# Patient Record
Sex: MALE | Race: BLACK OR AFRICAN AMERICAN | Employment: PART TIME | URBAN - METROPOLITAN AREA
[De-identification: names, ages, dates, MRNs, and addresses within clinical notes are randomized per-mention and may not be internally consistent; named-entity substitution may affect disease eponyms.]

---

## 2018-11-03 ENCOUNTER — HOSPITAL ENCOUNTER (EMERGENCY)
Age: 18
Discharge: HOME | End: 2018-11-03
Attending: EMERGENCY MEDICINE
Payer: SELF-PAY

## 2018-11-03 VITALS
WEIGHT: 115 LBS | RESPIRATION RATE: 14 BRPM | TEMPERATURE: 97.7 F | HEART RATE: 66 BPM | OXYGEN SATURATION: 100 % | DIASTOLIC BLOOD PRESSURE: 71 MMHG | SYSTOLIC BLOOD PRESSURE: 113 MMHG

## 2018-11-03 DIAGNOSIS — K52.9 GASTROENTERITIS, ACUTE: Primary | ICD-10-CM

## 2018-11-03 LAB
ALBUMIN SERPL-MCNC: 4.7 G/DL (ref 3.5–5)
ALBUMIN/GLOB SERPL: 1.3 {RATIO} (ref 1.1–2.2)
ALP SERPL-CCNC: 81 U/L (ref 45–117)
ALT SERPL-CCNC: 53 U/L (ref 12–78)
ANION GAP SERPL CALC-SCNC: 15 MMOL/L (ref 5–15)
AST SERPL-CCNC: 64 U/L (ref 15–37)
BASOPHILS # BLD: 0 K/UL (ref 0–0.1)
BASOPHILS NFR BLD: 0 % (ref 0–1)
BILIRUB SERPL-MCNC: 0.6 MG/DL (ref 0.2–1)
BUN SERPL-MCNC: 18 MG/DL (ref 6–20)
BUN/CREAT SERPL: 17 (ref 12–20)
CALCIUM SERPL-MCNC: 9.6 MG/DL (ref 8.5–10.1)
CHLORIDE SERPL-SCNC: 102 MMOL/L (ref 97–108)
CO2 SERPL-SCNC: 26 MMOL/L (ref 21–32)
CREAT SERPL-MCNC: 1.04 MG/DL (ref 0.7–1.3)
DIFFERENTIAL METHOD BLD: ABNORMAL
EOSINOPHIL # BLD: 0 K/UL (ref 0–0.4)
EOSINOPHIL NFR BLD: 0 % (ref 0–7)
ERYTHROCYTE [DISTWIDTH] IN BLOOD BY AUTOMATED COUNT: 12.7 % (ref 11.5–14.5)
GLOBULIN SER CALC-MCNC: 3.6 G/DL (ref 2–4)
GLUCOSE SERPL-MCNC: 80 MG/DL (ref 65–100)
HCT VFR BLD AUTO: 46.9 % (ref 36.6–50.3)
HGB BLD-MCNC: 16.4 G/DL (ref 12.1–17)
IMM GRANULOCYTES # BLD: 0.1 K/UL (ref 0–0.04)
IMM GRANULOCYTES NFR BLD AUTO: 0 % (ref 0–0.5)
LYMPHOCYTES # BLD: 1.2 K/UL (ref 0.8–3.5)
LYMPHOCYTES NFR BLD: 10 % (ref 12–49)
MCH RBC QN AUTO: 31.2 PG (ref 26–34)
MCHC RBC AUTO-ENTMCNC: 35 G/DL (ref 30–36.5)
MCV RBC AUTO: 89.2 FL (ref 80–99)
MONOCYTES # BLD: 0.4 K/UL (ref 0–1)
MONOCYTES NFR BLD: 4 % (ref 5–13)
NEUTS SEG # BLD: 9.6 K/UL (ref 1.8–8)
NEUTS SEG NFR BLD: 85 % (ref 32–75)
NRBC # BLD: 0 K/UL (ref 0–0.01)
NRBC BLD-RTO: 0 PER 100 WBC
PLATELET # BLD AUTO: 204 K/UL (ref 150–400)
PMV BLD AUTO: 10.3 FL (ref 8.9–12.9)
POTASSIUM SERPL-SCNC: 3.7 MMOL/L (ref 3.5–5.1)
PROT SERPL-MCNC: 8.3 G/DL (ref 6.4–8.2)
RBC # BLD AUTO: 5.26 M/UL (ref 4.1–5.7)
SODIUM SERPL-SCNC: 143 MMOL/L (ref 136–145)
WBC # BLD AUTO: 11.3 K/UL (ref 4.1–11.1)

## 2018-11-03 PROCEDURE — 96360 HYDRATION IV INFUSION INIT: CPT

## 2018-11-03 PROCEDURE — 80053 COMPREHEN METABOLIC PANEL: CPT | Performed by: NURSE PRACTITIONER

## 2018-11-03 PROCEDURE — 36415 COLL VENOUS BLD VENIPUNCTURE: CPT | Performed by: NURSE PRACTITIONER

## 2018-11-03 PROCEDURE — 85025 COMPLETE CBC W/AUTO DIFF WBC: CPT | Performed by: NURSE PRACTITIONER

## 2018-11-03 PROCEDURE — 99284 EMERGENCY DEPT VISIT MOD MDM: CPT

## 2018-11-03 PROCEDURE — 74011250636 HC RX REV CODE- 250/636: Performed by: NURSE PRACTITIONER

## 2018-11-03 PROCEDURE — 96361 HYDRATE IV INFUSION ADD-ON: CPT

## 2018-11-03 RX ORDER — ONDANSETRON 4 MG/1
4 TABLET, ORALLY DISINTEGRATING ORAL
Qty: 10 TAB | Refills: 0 | Status: SHIPPED | OUTPATIENT
Start: 2018-11-03 | End: 2019-12-30

## 2018-11-03 RX ADMIN — SODIUM CHLORIDE 1000 ML: 900 INJECTION, SOLUTION INTRAVENOUS at 13:34

## 2018-11-03 NOTE — ED PROVIDER NOTES
EMERGENCY DEPARTMENT HISTORY AND PHYSICAL EXAM 
 
Date: 11/3/2018 Patient Name: Linnea Sandoval History of Presenting Illness Chief Complaint Patient presents with  Vomiting History Provided By: Patient Chief Complaint: vomitng Duration: onset 7am  
Timing:  Acute Quality: large amount of liquid and undigested food Severity: Severe Modifying Factors: none Associated Symptoms: abdominal pain HPI: Linnea Sandoval is a 19 y.o. male with a PMH of No significant past medical history who presents with vomiting since 7am. States he ate chinese food last night and had one beer. States this am he vomited several times. States he does not usually eat much due to lack of food. PCP: Priya Steele MD 
 
 
 
Past History Past Medical History: 
History reviewed. No pertinent past medical history. Past Surgical History: 
History reviewed. No pertinent surgical history. Family History: 
History reviewed. No pertinent family history. Social History: 
Social History Tobacco Use  Smoking status: Current Every Day Smoker  Smokeless tobacco: Never Used Substance Use Topics  Alcohol use: Yes  Drug use: Yes Types: Marijuana Allergies: 
No Known Allergies Review of Systems Review of Systems Constitutional: Negative for chills, fatigue and fever. HENT: Negative for congestion and sore throat. Eyes: Negative for redness. Respiratory: Negative for cough, chest tightness and wheezing. Cardiovascular: Negative for chest pain. Gastrointestinal: Positive for abdominal pain, nausea and vomiting. Genitourinary: Negative for dysuria. Musculoskeletal: Negative for arthralgias, back pain, myalgias, neck pain and neck stiffness. Skin: Negative for rash. Neurological: Negative for dizziness, syncope, weakness, light-headedness, numbness and headaches. Hematological: Negative for adenopathy. Psychiatric/Behavioral: Negative for agitation and behavioral problems. All other systems reviewed and are negative. Physical Exam  
 
Vitals:  
 11/03/18 1312 BP: 113/71 Pulse: 66 Resp: 14 Temp: 97.7 °F (36.5 °C) SpO2: 100% Weight: 52.2 kg (115 lb) Physical Exam  
Constitutional: He is oriented to person, place, and time. He appears well-developed and well-nourished. HENT:  
Head: Normocephalic and atraumatic. Right Ear: External ear normal.  
Mouth/Throat: Oropharynx is clear and moist.  
Eyes: Conjunctivae are normal. Right eye exhibits no discharge. Left eye exhibits no discharge. Neck: Normal range of motion. Neck supple. Cardiovascular: Normal rate, regular rhythm and normal heart sounds. Pulmonary/Chest: Effort normal and breath sounds normal. No respiratory distress. He has no wheezes. Abdominal: Soft. Bowel sounds are normal. There is tenderness. Musculoskeletal: Normal range of motion. He exhibits no edema. Lymphadenopathy:  
  He has no cervical adenopathy. Neurological: He is alert and oriented to person, place, and time. No cranial nerve deficit. Skin: Skin is warm and dry. Psychiatric: He has a normal mood and affect. His behavior is normal. Judgment and thought content normal.  
Nursing note and vitals reviewed. Tolerated 12 oz ginger ale. Feels better will discharge. Diagnostic Study Results Labs - Recent Results (from the past 12 hour(s)) CBC WITH AUTOMATED DIFF Collection Time: 11/03/18  1:38 PM  
Result Value Ref Range WBC 11.3 (H) 4.1 - 11.1 K/uL  
 RBC 5.26 4.10 - 5.70 M/uL  
 HGB 16.4 12.1 - 17.0 g/dL HCT 46.9 36.6 - 50.3 % MCV 89.2 80.0 - 99.0 FL  
 MCH 31.2 26.0 - 34.0 PG  
 MCHC 35.0 30.0 - 36.5 g/dL  
 RDW 12.7 11.5 - 14.5 % PLATELET 476 499 - 030 K/uL MPV 10.3 8.9 - 12.9 FL  
 NRBC 0.0 0  WBC ABSOLUTE NRBC 0.00 0.00 - 0.01 K/uL NEUTROPHILS 85 (H) 32 - 75 % LYMPHOCYTES 10 (L) 12 - 49 % MONOCYTES 4 (L) 5 - 13 % EOSINOPHILS 0 0 - 7 % BASOPHILS 0 0 - 1 % IMMATURE GRANULOCYTES 0 0.0 - 0.5 % ABS. NEUTROPHILS 9.6 (H) 1.8 - 8.0 K/UL  
 ABS. LYMPHOCYTES 1.2 0.8 - 3.5 K/UL  
 ABS. MONOCYTES 0.4 0.0 - 1.0 K/UL  
 ABS. EOSINOPHILS 0.0 0.0 - 0.4 K/UL  
 ABS. BASOPHILS 0.0 0.0 - 0.1 K/UL  
 ABS. IMM. GRANS. 0.1 (H) 0.00 - 0.04 K/UL  
 DF AUTOMATED METABOLIC PANEL, COMPREHENSIVE Collection Time: 11/03/18  1:38 PM  
Result Value Ref Range Sodium 143 136 - 145 mmol/L Potassium 3.7 3.5 - 5.1 mmol/L Chloride 102 97 - 108 mmol/L  
 CO2 26 21 - 32 mmol/L Anion gap 15 5 - 15 mmol/L Glucose 80 65 - 100 mg/dL BUN 18 6 - 20 MG/DL Creatinine 1.04 0.70 - 1.30 MG/DL  
 BUN/Creatinine ratio 17 12 - 20 GFR est AA >60 >60 ml/min/1.73m2 GFR est non-AA >60 >60 ml/min/1.73m2 Calcium 9.6 8.5 - 10.1 MG/DL Bilirubin, total 0.6 0.2 - 1.0 MG/DL  
 ALT (SGPT) 53 12 - 78 U/L  
 AST (SGOT) 64 (H) 15 - 37 U/L Alk. phosphatase 81 45 - 117 U/L Protein, total 8.3 (H) 6.4 - 8.2 g/dL Albumin 4.7 3.5 - 5.0 g/dL Globulin 3.6 2.0 - 4.0 g/dL A-G Ratio 1.3 1.1 - 2.2 Radiologic Studies - No orders to display CT Results  (Last 48 hours) None CXR Results  (Last 48 hours) None Medical Decision Making I am the first provider for this patient. I reviewed the vital signs, available nursing notes, past medical history, past surgical history, family history and social history. Vital Signs-Reviewed the patient's vital signs. Records Reviewed: Nursing Notes and Old Medical Records Disposition: 
home DISCHARGE NOTE:  
 
 
 
  Care plan outlined and precautions discussed. Patient has no new complaints, changes, or physical findings. Results of tests were reviewed with the patient. All medications were reviewed with the patient; will d/c home with zofran. All of pt's questions and concerns were addressed. Patient was instructed and agrees to follow up with PCP, as well as to return to the ED upon further deterioration. Patient is ready to go home. Follow-up Information Follow up With Specialties Details Why Contact Info Abel Nair MD Pediatrics In 2 days  [address] 
[phone] Current Discharge Medication List  
  
START taking these medications Details  
ondansetron (ZOFRAN ODT) 4 mg disintegrating tablet Take 1 Tab by mouth every eight (8) hours as needed for Nausea. Qty: 10 Tab, Refills: 0 Provider Notes (Medical Decision Making): DDX gastroenteritis dehydration food poisoning Procedures: 
Procedures Diagnosis Clinical Impression:  
1. Gastroenteritis, acute

## 2018-11-03 NOTE — DISCHARGE INSTRUCTIONS
Gastroenteritis: Care Instructions  Your Care Instructions    Gastroenteritis is an illness that may cause nausea, vomiting, and diarrhea. It is sometimes called \"stomach flu. \" It can be caused by bacteria or a virus. You will probably begin to feel better in 1 to 2 days. In the meantime, get plenty of rest and make sure you do not become dehydrated. Dehydration occurs when your body loses too much fluid. Follow-up care is a key part of your treatment and safety. Be sure to make and go to all appointments, and call your doctor if you are having problems. It's also a good idea to know your test results and keep a list of the medicines you take. How can you care for yourself at home? · If your doctor prescribed antibiotics, take them as directed. Do not stop taking them just because you feel better. You need to take the full course of antibiotics. · Drink plenty of fluids to prevent dehydration, enough so that your urine is light yellow or clear like water. Choose water and other caffeine-free clear liquids until you feel better. If you have kidney, heart, or liver disease and have to limit fluids, talk with your doctor before you increase your fluid intake. · Drink fluids slowly, in frequent, small amounts, because drinking too much too fast can cause vomiting. · Begin eating mild foods, such as dry toast, yogurt, applesauce, bananas, and rice. Avoid spicy, hot, or high-fat foods, and do not drink alcohol or caffeine for a day or two. Do not drink milk or eat ice cream until you are feeling better. How to prevent gastroenteritis  · Keep hot foods hot and cold foods cold. · Do not eat meats, dressings, salads, or other foods that have been kept at room temperature for more than 2 hours. · Use a thermometer to check your refrigerator. It should be between 34°F and 40°F.  · Defrost meats in the refrigerator or microwave, not on the kitchen counter. · Keep your hands and your kitchen clean.  Wash your hands, cutting boards, and countertops with hot soapy water frequently. · Cook meat until it is well done. · Do not eat raw eggs or uncooked sauces made with raw eggs. · Do not take chances. If food looks or tastes spoiled, throw it out. When should you call for help? Call 911 anytime you think you may need emergency care. For example, call if:    · You vomit blood or what looks like coffee grounds.     · You passed out (lost consciousness).     · You pass maroon or very bloody stools.    Call your doctor now or seek immediate medical care if:    · You have severe belly pain.     · You have signs of needing more fluids. You have sunken eyes, a dry mouth, and pass only a little dark urine.     · You feel like you are going to faint.     · You have increased belly pain that does not go away in 1 to 2 days.     · You have new or increased nausea, or you are vomiting.     · You have a new or higher fever.     · Your stools are black and tarlike or have streaks of blood.    Watch closely for changes in your health, and be sure to contact your doctor if:    · You are dizzy or lightheaded.     · You urinate less than usual, or your urine is dark yellow or brown.     · You do not feel better with each day that goes by. Where can you learn more? Go to http://krzysztof-jaswinder.info/. Enter N142 in the search box to learn more about \"Gastroenteritis: Care Instructions. \"  Current as of: November 18, 2017  Content Version: 11.8  © 0866-1571 [employer]. Care instructions adapted under license by [employer] (which disclaims liability or warranty for this information). If you have questions about a medical condition or this instruction, always ask your healthcare professional. [place] [address] any warranty or liability for your use of this information.

## 2018-11-03 NOTE — ED NOTES
Emergency Department Nursing Plan of Care The Nursing Plan of Care is developed from the Nursing assessment and Emergency Department Attending provider initial evaluation. The plan of care may be reviewed in the ED Provider note. The Plan of Care was developed with the following considerations:  
Patient / Family readiness to learn indicated by:verbalized understanding Persons(s) to be included in education: patient Barriers to Learning/Limitations:No 
 
Signed Torrie Baumgarten, RN   
11/3/2018   2:19 PM

## 2019-01-23 ENCOUNTER — HOSPITAL ENCOUNTER (EMERGENCY)
Age: 19
Discharge: HOME | End: 2019-01-23
Attending: EMERGENCY MEDICINE
Payer: MEDICAID

## 2019-01-23 ENCOUNTER — APPOINTMENT (OUTPATIENT)
Dept: GENERAL RADIOLOGY | Age: 19
End: 2019-01-23
Attending: PHYSICIAN ASSISTANT
Payer: MEDICAID

## 2019-01-23 VITALS
DIASTOLIC BLOOD PRESSURE: 92 MMHG | OXYGEN SATURATION: 98 % | WEIGHT: 107.81 LBS | HEIGHT: 66 IN | HEART RATE: 113 BPM | SYSTOLIC BLOOD PRESSURE: 108 MMHG | TEMPERATURE: 100 F | BODY MASS INDEX: 17.33 KG/M2 | RESPIRATION RATE: 20 BRPM

## 2019-01-23 DIAGNOSIS — J10.1 INFLUENZA A: Primary | ICD-10-CM

## 2019-01-23 LAB
FLUAV AG NPH QL IA: POSITIVE
FLUBV AG NOSE QL IA: NEGATIVE

## 2019-01-23 PROCEDURE — 99283 EMERGENCY DEPT VISIT LOW MDM: CPT

## 2019-01-23 PROCEDURE — 71045 X-RAY EXAM CHEST 1 VIEW: CPT

## 2019-01-23 PROCEDURE — 87804 INFLUENZA ASSAY W/OPTIC: CPT

## 2019-01-23 PROCEDURE — 74011250637 HC RX REV CODE- 250/637: Performed by: PHYSICIAN ASSISTANT

## 2019-01-23 RX ORDER — NAPROXEN 500 MG/1
500 TABLET ORAL 2 TIMES DAILY WITH MEALS
Qty: 20 TAB | Refills: 0 | Status: SHIPPED | OUTPATIENT
Start: 2019-01-23 | End: 2019-12-30

## 2019-01-23 RX ORDER — IBUPROFEN 400 MG/1
400 TABLET ORAL
Status: COMPLETED | OUTPATIENT
Start: 2019-01-23 | End: 2019-01-23

## 2019-01-23 RX ADMIN — IBUPROFEN 400 MG: 400 TABLET ORAL at 11:05

## 2019-01-23 NOTE — DISCHARGE INSTRUCTIONS
Patient Education        Influenza (Flu): Care Instructions  Your Care Instructions    Influenza (flu) is an infection in the lungs and breathing passages. It is caused by the influenza virus. There are different strains, or types, of the flu virus from year to year. Unlike the common cold, the flu comes on suddenly and the symptoms, such as a cough, congestion, fever, chills, fatigue, aches, and pains, are more severe. These symptoms may last up to 10 days. Although the flu can make you feel very sick, it usually doesn't cause serious health problems. Home treatment is usually all you need for flu symptoms. But your doctor may prescribe antiviral medicine to prevent other health problems, such as pneumonia, from developing. Older people and those who have a long-term health condition, such as lung disease, are most at risk for having pneumonia or other health problems. Follow-up care is a key part of your treatment and safety. Be sure to make and go to all appointments, and call your doctor if you are having problems. It's also a good idea to know your test results and keep a list of the medicines you take. How can you care for yourself at home? · Get plenty of rest.  · Drink plenty of fluids, enough so that your urine is light yellow or clear like water. If you have kidney, heart, or liver disease and have to limit fluids, talk with your doctor before you increase the amount of fluids you drink. · Take an over-the-counter pain medicine if needed, such as acetaminophen (Tylenol), ibuprofen (Advil, Motrin), or naproxen (Aleve), to relieve fever, headache, and muscle aches. Read and follow all instructions on the label. No one younger than 20 should take aspirin. It has been linked to Reye syndrome, a serious illness. · Do not smoke. Smoking can make the flu worse. If you need help quitting, talk to your doctor about stop-smoking programs and medicines.  These can increase your chances of quitting for good.  · Breathe moist air from a hot shower or from a sink filled with hot water to help clear a stuffy nose. · Before you use cough and cold medicines, check the label. These medicines may not be safe for young children or for people with certain health problems. · If the skin around your nose and lips becomes sore, put some petroleum jelly on the area. · To ease coughing:  ? Drink fluids to soothe a scratchy throat. ? Suck on cough drops or plain hard candy. ? Take an over-the-counter cough medicine that contains dextromethorphan to help you get some sleep. Read and follow all instructions on the label. ? Raise your head at night with an extra pillow. This may help you rest if coughing keeps you awake. · Take any prescribed medicine exactly as directed. Call your doctor if you think you are having a problem with your medicine. To avoid spreading the flu  · Wash your hands regularly, and keep your hands away from your face. · Stay home from school, work, and other public places until you are feeling better and your fever has been gone for at least 24 hours. The fever needs to have gone away on its own without the help of medicine. · Ask people living with you to talk to their doctors about preventing the flu. They may get antiviral medicine to keep from getting the flu from you. · To prevent the flu in the future, get a flu vaccine every fall. Encourage people living with you to get the vaccine. · Cover your mouth when you cough or sneeze. When should you call for help? Call 911 anytime you think you may need emergency care.  For example, call if:    · You have severe trouble breathing.    Call your doctor now or seek immediate medical care if:    · You have new or worse trouble breathing.     · You seem to be getting much sicker.     · You feel very sleepy or confused.     · You have a new or higher fever.     · You get a new rash.    Watch closely for changes in your health, and be sure to contact your doctor if:    · You begin to get better and then get worse.     · You are not getting better after 1 week. Where can you learn more? Go to http://krzysztof-jaswinder.info/. Enter W975 in the search box to learn more about \"Influenza (Flu): Care Instructions. \"  Current as of: September 5, 2018  Content Version: 11.9  © 2587-3031 [employer]. Care instructions adapted under license by [employer] (which disclaims liability or warranty for this information). If you have questions about a medical condition or this instruction, always ask your healthcare professional. [place] [address] any warranty or liability for your use of this information.

## 2019-01-23 NOTE — ED PROVIDER NOTES
EMERGENCY DEPARTMENT HISTORY AND PHYSICAL EXAM 
 
 
Date: 1/23/2019 Patient Name: Shad Dave History of Presenting Illness Chief Complaint Patient presents with  Generalized Body Aches  
  since Sat, states he is hot and sweating at night, fever unknown, headache  Vomiting  
  with diarrhea since this AM  
 
 
History Provided By: Patient HPI: Shad Dave, 19 y.o. male with no significant PMHx, presents ambulatory to the ED with cc of myalgias with assoc productive cough and congestion x 3 days. Subjective fever. Denies sore throat, ear pain. Has not taken anything for sx. Denies abd pain. Reports intermittent N/V and diarrhea two days ago. Denies currently. Rates discomfort as 6/10. Describes pain as a constant ache, made worse with movement. There are no other complaints, changes, or physical findings at this time. PCP: Ileana Amor MD 
 
No current facility-administered medications on file prior to encounter. Current Outpatient Medications on File Prior to Encounter Medication Sig Dispense Refill  ondansetron (ZOFRAN ODT) 4 mg disintegrating tablet Take 1 Tab by mouth every eight (8) hours as needed for Nausea. 10 Tab 0 Past History Past Medical History: 
History reviewed. No pertinent past medical history. Past Surgical History: 
History reviewed. No pertinent surgical history. Family History: 
History reviewed. No pertinent family history. Social History: 
Social History Tobacco Use  Smoking status: Current Every Day Smoker  Smokeless tobacco: Current User Substance Use Topics  Alcohol use: Yes  Drug use: Yes Types: Marijuana Allergies: 
No Known Allergies Review of Systems Review of Systems Constitutional: Negative for chills and fever. HENT: Positive for congestion. Negative for ear pain, facial swelling, sore throat and trouble swallowing. Eyes: Negative for photophobia and visual disturbance. Respiratory: Positive for cough. Negative for shortness of breath. Cardiovascular: Negative for chest pain. Gastrointestinal: Negative for abdominal pain, nausea and vomiting. Genitourinary: Negative for flank pain. Musculoskeletal: Positive for myalgias. Skin: Negative for color change, pallor, rash and wound. Neurological: Negative for dizziness, weakness, light-headedness and headaches. All other systems reviewed and are negative. Physical Exam  
Physical Exam  
Constitutional: He is oriented to person, place, and time. He appears well-developed and well-nourished. No distress. HENT:  
Head: Normocephalic and atraumatic. Right Ear: Tympanic membrane, external ear and ear canal normal.  
Left Ear: Tympanic membrane, external ear and ear canal normal.  
Nose: Mucosal edema present. Mouth/Throat: Uvula is midline and oropharynx is clear and moist. No posterior oropharyngeal erythema. Eyes: Conjunctivae are normal.  
Cardiovascular: Normal rate, regular rhythm and normal heart sounds. Pulmonary/Chest: Effort normal and breath sounds normal. No respiratory distress. He has no wheezes. He has no rales. Lungs CTA Abdominal: Soft. Bowel sounds are normal. There is no tenderness. Musculoskeletal: Normal range of motion. Neurological: He is alert and oriented to person, place, and time. No cranial nerve deficit. Skin: Skin is warm. No rash noted. Psychiatric: He has a normal mood and affect. His behavior is normal.  
Nursing note and vitals reviewed. Diagnostic Study Results Labs - Recent Results (from the past 12 hour(s)) INFLUENZA A & B AG (RAPID TEST) Collection Time: 01/23/19 10:57 AM  
Result Value Ref Range Influenza A Antigen POSITIVE (A) NEG Influenza B Antigen NEGATIVE  NEG Radiologic Studies -  
XR CHEST PORT Final Result IMPRESSION: Normal chest.  
  
 
 CT Results  (Last 48 hours) None CXR Results  (Last 48 hours) 01/23/19 1109  XR CHEST PORT Final result Impression:  IMPRESSION: Normal chest.  
  
 Narrative:  EXAM: XR CHEST PORT INDICATION: productive cough + fever COMPARISON: None. FINDINGS: A portable AP radiograph of the chest was obtained at 1055 hours. The  
lungs are clear. The cardiac and mediastinal contours and pulmonary vascularity  
are normal.  The bones and soft tissues are grossly within normal limits. Medical Decision Making I am the first provider for this patient. I reviewed the vital signs, available nursing notes, past medical history, past surgical history, family history and social history. Vital Signs-Reviewed the patient's vital signs. Patient Vitals for the past 12 hrs: 
 Temp Pulse Resp BP SpO2  
01/23/19 0931 100 °F (37.8 °C) (!) 113 20 (!) 108/92 98 % Records Reviewed: Nursing Notes and Old Medical Records Provider Notes (Medical Decision Making): DDx: Influenza, URI, PNA, Bronchitis ED Course:  
Initial assessment performed. The patients presenting problems have been discussed, and they are in agreement with the care plan formulated and outlined with them. I have encouraged them to ask questions as they arise throughout their visit. Disposition: 
Discussed lab and imaging results with pt along with dx and treatment plan. Discussed importance of PCP follow up. All questions answered. Pt voiced they understood. Return if sx worsen. PLAN: 
1. Current Discharge Medication List  
  
START taking these medications Details  
guaiFENesin-dextromethorphan SR (MUCINEX DM) 600-30 mg per tablet Take 1 Tab by mouth two (2) times a day. Qty: 15 Tab, Refills: 0  
  
naproxen (NAPROSYN) 500 mg tablet Take 1 Tab by mouth two (2) times daily (with meals). Qty: 20 Tab, Refills: 0  
  
  
 
2. Follow-up Information Follow up With Specialties Details Why Contact Info Ailyn Becker MD Pediatrics Schedule an appointment as soon as possible for a visit As needed [address] [facility] 103 [address] 
[phone] Return to ED if worse Diagnosis Clinical Impression:  
1. Influenza A

## 2019-01-23 NOTE — ED NOTES
Pt reported generalized body aches,productive cough with yellow mucus in small amt,fever,chills x 4 days and n/v/d started in this morning,denies taking any meds for this sx. Emergency Department Nursing Plan of Care The Nursing Plan of Care is developed from the Nursing assessment and Emergency Department Attending provider initial evaluation. The plan of care may be reviewed in the ED Provider note. The Plan of Care was developed with the following considerations:  
Patient / Family readiness to learn indicated by:verbalized understanding Persons(s) to be included in education: patient Barriers to Learning/Limitations:No 
 
Signed Shahram Bruce RN   
1/23/2019   11:14 AM

## 2019-01-23 NOTE — LETTER
[facility] EMERGENCY DEPT 
[address] [facility] [id number] [phone] Work/School Note Date: 1/23/2019 To Whom It May concern: 
 
Anatoly Brooks III was seen and treated today in the emergency room by the following provider(s): 
Attending Provider: Abby Giraldo MD 
Physician Assistant: CYNDI Acharya. Torrie Payer may return to work on 1/25/2019. Sincerely, CYNDI Sepulveda

## 2019-01-23 NOTE — PROGRESS NOTES
CM reviewed chart. CM provided pt with a med voucher. Also gave pt a list of food bank places to retrieve food for his household. [facility] MSW, HP

## 2019-12-30 ENCOUNTER — HOSPITAL ENCOUNTER (EMERGENCY)
Age: 19
Discharge: HOME | End: 2019-12-30
Attending: EMERGENCY MEDICINE
Payer: SELF-PAY

## 2019-12-30 VITALS
HEART RATE: 101 BPM | WEIGHT: 109 LBS | SYSTOLIC BLOOD PRESSURE: 115 MMHG | HEIGHT: 66 IN | OXYGEN SATURATION: 100 % | BODY MASS INDEX: 17.52 KG/M2 | RESPIRATION RATE: 18 BRPM | DIASTOLIC BLOOD PRESSURE: 72 MMHG | TEMPERATURE: 102.1 F

## 2019-12-30 DIAGNOSIS — J11.1 INFLUENZA: Primary | ICD-10-CM

## 2019-12-30 LAB
FLUAV AG NPH QL IA: NEGATIVE
FLUBV AG NOSE QL IA: POSITIVE

## 2019-12-30 PROCEDURE — 87804 INFLUENZA ASSAY W/OPTIC: CPT

## 2019-12-30 PROCEDURE — 74011250637 HC RX REV CODE- 250/637: Performed by: EMERGENCY MEDICINE

## 2019-12-30 PROCEDURE — 99283 EMERGENCY DEPT VISIT LOW MDM: CPT

## 2019-12-30 RX ORDER — IBUPROFEN 600 MG/1
600 TABLET ORAL
Qty: 20 TAB | Refills: 0 | OUTPATIENT
Start: 2019-12-30 | End: 2022-04-24

## 2019-12-30 RX ORDER — OSELTAMIVIR PHOSPHATE 75 MG/1
75 CAPSULE ORAL 2 TIMES DAILY
Qty: 10 CAP | Refills: 0 | Status: SHIPPED | OUTPATIENT
Start: 2019-12-30 | End: 2020-01-04

## 2019-12-30 RX ORDER — IBUPROFEN 600 MG/1
600 TABLET ORAL
Status: COMPLETED | OUTPATIENT
Start: 2019-12-30 | End: 2019-12-30

## 2019-12-30 RX ORDER — ONDANSETRON 4 MG/1
8 TABLET, ORALLY DISINTEGRATING ORAL
Status: COMPLETED | OUTPATIENT
Start: 2019-12-30 | End: 2019-12-30

## 2019-12-30 RX ORDER — ACETAMINOPHEN 500 MG
1000 TABLET ORAL
Status: COMPLETED | OUTPATIENT
Start: 2019-12-30 | End: 2019-12-30

## 2019-12-30 RX ADMIN — IBUPROFEN 600 MG: 600 TABLET, FILM COATED ORAL at 18:57

## 2019-12-30 RX ADMIN — ONDANSETRON 8 MG: 4 TABLET, ORALLY DISINTEGRATING ORAL at 18:57

## 2019-12-30 RX ADMIN — ACETAMINOPHEN 1000 MG: 500 TABLET, FILM COATED ORAL at 19:45

## 2019-12-30 NOTE — LETTER
[facility] EMERGENCY DEPT 
[address] [address] 
[phone] Work/School Note Date: 12/30/2019 To Whom It May concern: 
 
Pittman Matters III was seen and treated today in the emergency room by the following provider(s): 
Attending Provider: Adam Toussaint DO. Pittman Matters III may return to work on 01/02/19, may return earlier if fever free for 24 hours. Sincerely, Megha Thomas DO

## 2019-12-31 NOTE — ED NOTES
Pt presents to ED ambulatory complaining of productive cough, N/V/D, generalized body aches, and abdominal pain x4 days. Pt denies taking medications for relief  PTA. Pt is alert and oriented x 4, RR even and unlabored, skin is warm and dry. Assessment completed and pt updated on plan of care. Emergency Department Nursing Plan of Care       The Nursing Plan of Care is developed from the Nursing assessment and Emergency Department Attending provider initial evaluation. The plan of care may be reviewed in the ED Provider note.     The Plan of Care was developed with the following considerations:   Patient / Family readiness to learn indicated by:verbalized understanding  Persons(s) to be included in education: patient  Barriers to Learning/Limitations:No    Signed     Stephenie Jj RN    12/30/2019   7:13 PM

## 2019-12-31 NOTE — ED PROVIDER NOTES
EMERGENCY DEPARTMENT HISTORY AND PHYSICAL EXAM      Date: 12/30/2019  Patient Name: Tiana Bear    Please note that this dictation was completed with [employer], the computer voice recognition software. Quite often unanticipated grammatical, syntax, homophones, and other interpretive errors are inadvertently transcribed by the computer software. Please disregard these errors. Please excuse any errors that have escaped final proofreading. History of Presenting Illness     Chief Complaint   Patient presents with    Vomiting       History Provided By: Patient    HPI: Mary Blake III, 22 y.o. male, presenting the emergency department complaining of 4 days of general malaise, fatigue, myalgia, nausea and vomiting. Reports a nonproductive cough. Reports diffuse muscle aches. Reports nausea and vomiting. He states he has missed several days of work for this. Found to be febrile with a temp of 101.7 here. Denies any rashes, abdominal pain, change in stool. PCP: Shen Simmons MD    No current facility-administered medications on file prior to encounter. Current Outpatient Medications on File Prior to Encounter   Medication Sig Dispense Refill    [DISCONTINUED] guaiFENesin-dextromethorphan SR (MUCINEX DM) 600-30 mg per tablet Take 1 Tab by mouth two (2) times a day. 15 Tab 0    [DISCONTINUED] naproxen (NAPROSYN) 500 mg tablet Take 1 Tab by mouth two (2) times daily (with meals). 20 Tab 0    [DISCONTINUED] ondansetron (ZOFRAN ODT) 4 mg disintegrating tablet Take 1 Tab by mouth every eight (8) hours as needed for Nausea. 10 Tab 0       Past History     Past Medical History:  History reviewed. No pertinent past medical history. Past Surgical History:  History reviewed. No pertinent surgical history. Family History:  History reviewed. No pertinent family history.     Social History:  Social History     Tobacco Use    Smoking status: Current Every Day Smoker     Packs/day: 0.25    Smokeless tobacco: Current User    Tobacco comment: Black and Milds   Substance Use Topics    Alcohol use: Yes    Drug use: Yes     Types: Marijuana       Allergies:  No Known Allergies      Review of Systems   Review of Systems   Constitutional: Positive for chills, fatigue and fever. HENT: Negative for congestion and sore throat. Eyes: Negative for visual disturbance. Respiratory: Negative for cough and shortness of breath. Cardiovascular: Negative for chest pain and leg swelling. Gastrointestinal: Positive for nausea and vomiting. Negative for abdominal pain, blood in stool and diarrhea. Endocrine: Negative for polyuria. Genitourinary: Negative for dysuria and testicular pain. Musculoskeletal: Positive for arthralgias and myalgias. Negative for joint swelling. Skin: Negative for rash. Allergic/Immunologic: Negative for immunocompromised state. Neurological: Negative for weakness and headaches. Hematological: Does not bruise/bleed easily. Psychiatric/Behavioral: Negative for confusion. Physical Exam   Physical Exam  Vitals signs and nursing note reviewed. Constitutional:       Appearance: He is well-developed. HENT:      Head: Normocephalic and atraumatic. Eyes:      General:         Right eye: No discharge. Left eye: No discharge. Conjunctiva/sclera: Conjunctivae normal.      Pupils: Pupils are equal, round, and reactive to light. Neck:      Musculoskeletal: Normal range of motion and neck supple. Trachea: No tracheal deviation. Cardiovascular:      Rate and Rhythm: Normal rate and regular rhythm. Heart sounds: Normal heart sounds. No murmur. Pulmonary:      Effort: Pulmonary effort is normal. No respiratory distress. Breath sounds: Normal breath sounds. No wheezing or rales. Abdominal:      General: Bowel sounds are normal.      Palpations: Abdomen is soft. Tenderness: There is no tenderness. There is no guarding or rebound. Musculoskeletal: Normal range of motion. General: No tenderness or deformity. Skin:     General: Skin is warm and dry. Findings: No erythema or rash. Neurological:      Mental Status: He is alert and oriented to person, place, and time. Psychiatric:         Behavior: Behavior normal.         Diagnostic Study Results     Labs -     Recent Results (from the past 12 hour(s))   INFLUENZA A & B AG (RAPID TEST)    Collection Time: 12/30/19  7:01 PM   Result Value Ref Range    Influenza A Antigen NEGATIVE  NEG      Influenza B Antigen POSITIVE (A) NEG         Radiologic Studies -   No orders to display     CT Results  (Last 48 hours)    None        CXR Results  (Last 48 hours)    None            Medical Decision Making   I am the first provider for this patient. I reviewed the vital signs, available nursing notes, past medical history, past surgical history, family history and social history. Vital Signs-Reviewed the patient's vital signs. Patient Vitals for the past 12 hrs:   Temp Pulse Resp BP SpO2   12/30/19 1947 (!) 102.1 °F (38.9 °C)       12/30/19 1814 (!) 101.7 °F (38.7 °C) (!) 101 18 115/72 100 %           Records Reviewed: Nursing Notes and Old Medical Records    Provider Notes (Medical Decision Making):   Consistent with viral syndrome, likely influenza. ED Course:   Initial assessment performed. The patients presenting problems have been discussed, and they are in agreement with the care plan formulated and outlined with them. I have encouraged them to ask questions as they arise throughout their visit. Critical Care Time:   none    Disposition:  DISCHARGE NOTE  Patients results have been reviewed with them.   Patient and/or family have verbally conveyed their understanding and agreement of the patient's signs, symptoms, diagnosis, treatment and prognosis and additionally agree to follow up as recommended or return to the Emergency Room should their condition change or have any new concerns prior to their follow-up appointment. Patient verbally agrees with the care-plan and verbally conveys that all of their questions have been answered. Discharge instructions have also been provided to the patient with some educational information regarding their diagnosis as well a list of reasons why they would want to return to the ER prior to their follow-up appointment should their condition change. PLAN:  1. Discharge Medication List as of 12/30/2019  7:35 PM      START taking these medications    Details   oseltamivir (TAMIFLU) 75 mg capsule Take 1 Cap by mouth two (2) times a day for 5 days. , Print, Disp-10 Cap, R-0      ibuprofen (MOTRIN) 600 mg tablet Take 1 Tab by mouth every six (6) hours as needed for Pain., Print, Disp-20 Tab, R-0           2. Follow-up Information     Follow up With Specialties Details Why Contact Info    Merritt Greene MD Pediatrics Schedule an appointment as soon as possible for a visit  [address]  [address] [address]  [phone]      [address] [place] [address] DEPT Emergency Medicine  If symptoms worsen [facility]  [phone]          Return to ED if worse     Diagnosis     Clinical Impression:   1. Influenza        Attestations:   This note was completed by Lucy Fonseca DO

## 2019-12-31 NOTE — DISCHARGE INSTRUCTIONS
Patient Education        Influenza (Flu): Care Instructions  Your Care Instructions    Influenza (flu) is an infection in the lungs and breathing passages. It is caused by the influenza virus. There are different strains, or types, of the flu virus from year to year. Unlike the common cold, the flu comes on suddenly and the symptoms, such as a cough, congestion, fever, chills, fatigue, aches, and pains, are more severe. These symptoms may last up to 10 days. Although the flu can make you feel very sick, it usually doesn't cause serious health problems. Home treatment is usually all you need for flu symptoms. But your doctor may prescribe antiviral medicine to prevent other health problems, such as pneumonia, from developing. Older people and those who have a long-term health condition, such as lung disease, are most at risk for having pneumonia or other health problems. Follow-up care is a key part of your treatment and safety. Be sure to make and go to all appointments, and call your doctor if you are having problems. It's also a good idea to know your test results and keep a list of the medicines you take. How can you care for yourself at home? · Get plenty of rest.  · Drink plenty of fluids, enough so that your urine is light yellow or clear like water. If you have kidney, heart, or liver disease and have to limit fluids, talk with your doctor before you increase the amount of fluids you drink. · Take an over-the-counter pain medicine if needed, such as acetaminophen (Tylenol), ibuprofen (Advil, Motrin), or naproxen (Aleve), to relieve fever, headache, and muscle aches. Read and follow all instructions on the label. No one younger than 20 should take aspirin. It has been linked to Reye syndrome, a serious illness. · Do not smoke. Smoking can make the flu worse. If you need help quitting, talk to your doctor about stop-smoking programs and medicines.  These can increase your chances of quitting for good.  · Breathe moist air from a hot shower or from a sink filled with hot water to help clear a stuffy nose. · Before you use cough and cold medicines, check the label. These medicines may not be safe for young children or for people with certain health problems. · If the skin around your nose and lips becomes sore, put some petroleum jelly on the area. · To ease coughing:  ? Drink fluids to soothe a scratchy throat. ? Suck on cough drops or plain hard candy. ? Take an over-the-counter cough medicine that contains dextromethorphan to help you get some sleep. Read and follow all instructions on the label. ? Raise your head at night with an extra pillow. This may help you rest if coughing keeps you awake. · Take any prescribed medicine exactly as directed. Call your doctor if you think you are having a problem with your medicine. To avoid spreading the flu  · Wash your hands regularly, and keep your hands away from your face. · Stay home from school, work, and other public places until you are feeling better and your fever has been gone for at least 24 hours. The fever needs to have gone away on its own without the help of medicine. · Ask people living with you to talk to their doctors about preventing the flu. They may get antiviral medicine to keep from getting the flu from you. · To prevent the flu in the future, get a flu vaccine every fall. Encourage people living with you to get the vaccine. · Cover your mouth when you cough or sneeze. When should you call for help? Call 911 anytime you think you may need emergency care.  For example, call if:    · You have severe trouble breathing.    Call your doctor now or seek immediate medical care if:    · You have new or worse trouble breathing.     · You seem to be getting much sicker.     · You feel very sleepy or confused.     · You have a new or higher fever.     · You get a new rash.    Watch closely for changes in your health, and be sure to contact your doctor if:    · You begin to get better and then get worse.     · You are not getting better after 1 week. Where can you learn more? Go to http://krzysztof-jaswinder.info/. Enter L254 in the search box to learn more about \"Influenza (Flu): Care Instructions. \"  Current as of: June 9, 2019  Content Version: 12.2  © 9457-9169 [employer]. Care instructions adapted under license by [employer] (which disclaims liability or warranty for this information). If you have questions about a medical condition or this instruction, always ask your healthcare professional. [place] [address] any warranty or liability for your use of this information.

## 2021-03-26 ENCOUNTER — HOSPITAL ENCOUNTER (EMERGENCY)
Age: 21
Discharge: HOME | End: 2021-03-26
Attending: EMERGENCY MEDICINE

## 2021-03-26 VITALS
SYSTOLIC BLOOD PRESSURE: 129 MMHG | DIASTOLIC BLOOD PRESSURE: 85 MMHG | WEIGHT: 120 LBS | BODY MASS INDEX: 19.29 KG/M2 | HEART RATE: 96 BPM | TEMPERATURE: 98.7 F | RESPIRATION RATE: 18 BRPM | HEIGHT: 66 IN | OXYGEN SATURATION: 100 %

## 2021-03-26 DIAGNOSIS — K52.9 GASTROENTERITIS, ACUTE: ICD-10-CM

## 2021-03-26 DIAGNOSIS — R51.9 NONINTRACTABLE HEADACHE, UNSPECIFIED CHRONICITY PATTERN, UNSPECIFIED HEADACHE TYPE: Primary | ICD-10-CM

## 2021-03-26 PROCEDURE — 74011250637 HC RX REV CODE- 250/637: Performed by: EMERGENCY MEDICINE

## 2021-03-26 PROCEDURE — 99283 EMERGENCY DEPT VISIT LOW MDM: CPT

## 2021-03-26 RX ORDER — IBUPROFEN 600 MG/1
600 TABLET ORAL
Status: DISCONTINUED | OUTPATIENT
Start: 2021-03-26 | End: 2021-03-27 | Stop reason: HOSPADM

## 2021-03-26 RX ORDER — BUTALBITAL, ACETAMINOPHEN AND CAFFEINE 300; 40; 50 MG/1; MG/1; MG/1
1 CAPSULE ORAL
Qty: 12 CAP | Refills: 0 | Status: SHIPPED | OUTPATIENT
Start: 2021-03-26

## 2021-03-26 RX ORDER — BUTALBITAL, ACETAMINOPHEN AND CAFFEINE 50; 325; 40 MG/1; MG/1; MG/1
1 TABLET ORAL
Status: DISCONTINUED | OUTPATIENT
Start: 2021-03-26 | End: 2021-03-27 | Stop reason: HOSPADM

## 2021-03-26 RX ORDER — ONDANSETRON 4 MG/1
4 TABLET, ORALLY DISINTEGRATING ORAL
Qty: 20 TAB | Refills: 0 | Status: SHIPPED | OUTPATIENT
Start: 2021-03-26 | End: 2021-04-03 | Stop reason: SDUPTHER

## 2021-03-26 RX ORDER — ONDANSETRON 4 MG/1
8 TABLET, ORALLY DISINTEGRATING ORAL
Status: COMPLETED | OUTPATIENT
Start: 2021-03-26 | End: 2021-03-26

## 2021-03-26 RX ADMIN — ONDANSETRON 8 MG: 4 TABLET, ORALLY DISINTEGRATING ORAL at 20:24

## 2021-03-26 NOTE — Clinical Note
[facility] EMERGENCY DEPT  [address] [address] [phone]    Work/School Note    Date: 3/26/2021    To Whom It May concern:    Leonard Howard III was seen and treated today in the emergency room by the following provider(s):  Attending Provider: Ever Ibrahim DO. Melvin Muhammad is excused from work/school on 03/26/21 and 03/27/21. He is medically clear to return to work/school on 3/28/2021.        Sincerely,          Azucena Ann DO

## 2021-03-27 NOTE — ED TRIAGE NOTES
C/o frontal headache x this morning with photophobia and N/V/D. Pt reports he took imodium x 1.5 hours ago.

## 2021-03-27 NOTE — ED NOTES
Attempted to give patient the rest of his medications for his headache now that the zofran had time to help with the nausea. Pt stated \"I dont want anything else from yall. No more  Medications. This lady came and gave me my bill and I have to pay $1300 out of pocket and sangita been here for 10 minutes. Ill just take a work note and leave. \"         . Sonny Velásquez Discharge summary and discharge medications reviewed with patient and appropriate educational materials and side effects teaching were provided. patient  Given 0 paper prescriptions and 2 electronic prescriptions sent to pt's listed pharmacy. Patient verbalized understanding of the importance of discussing medications with his or her physician or clinic they will be following up with. No si/s of acute distress prior to discharge. Patient offered wheelchair from treatment area to hospital entrance, patient declined wheelchair.

## 2021-03-27 NOTE — ED PROVIDER NOTES
EMERGENCY DEPARTMENT HISTORY AND PHYSICAL EXAM      Date: 3/26/2021  Patient Name: Margie Bacon    Please note that this dictation was completed with [employer], the computer voice recognition software. Quite often unanticipated grammatical, syntax, homophones, and other interpretive errors are inadvertently transcribed by the computer software. Please disregard these errors. Please excuse any errors that have escaped final proofreading. History of Presenting Illness     Chief Complaint   Patient presents with    Headache    Vomiting       History Provided By: Patient     HPI: Margie Bacon, 23 y.o. male, presenting the emergency department complaining of nausea, vomiting, diarrhea, headache. Symptoms started this morning, headache is frontal, described as pounding. Associated with photophobia. Reports nausea, vomiting, epigastric pain. Also reports loose stools. His mom gave him Imodium which helped with the loose stools, but continues to vomit, unable to tolerate p.o., rates his symptoms as moderate to severe. PCP: Goran Cortez MD    No current facility-administered medications on file prior to encounter. Current Outpatient Medications on File Prior to Encounter   Medication Sig Dispense Refill    ibuprofen (MOTRIN) 600 mg tablet Take 1 Tab by mouth every six (6) hours as needed for Pain. 20 Tab 0       Past History     Past Medical History:  No past medical history on file. Past Surgical History:  No past surgical history on file. Family History:  No family history on file. Social History:  Social History     Tobacco Use    Smoking status: Current Every Day Smoker     Packs/day: 0.25    Smokeless tobacco: Current User    Tobacco comment: Black and Milds   Substance Use Topics    Alcohol use: Yes    Drug use: Yes     Types: Marijuana       Allergies:  No Known Allergies      Review of Systems   Review of Systems   Constitutional: Negative for chills and fever.    HENT: Negative for congestion and sore throat. Eyes: Negative for visual disturbance. Respiratory: Negative for cough and shortness of breath. Cardiovascular: Negative for chest pain and leg swelling. Gastrointestinal: Positive for abdominal pain, nausea and vomiting. Negative for blood in stool and diarrhea. Endocrine: Negative for polyuria. Genitourinary: Negative for dysuria and testicular pain. Musculoskeletal: Negative for arthralgias, joint swelling and myalgias. Skin: Negative for rash. Allergic/Immunologic: Negative for immunocompromised state. Neurological: Positive for headaches. Negative for weakness. Hematological: Does not bruise/bleed easily. Psychiatric/Behavioral: Negative for confusion. Physical Exam   Physical Exam  Vitals signs and nursing note reviewed. Constitutional:       Appearance: He is well-developed. HENT:      Head: Normocephalic and atraumatic. Eyes:      General:         Right eye: No discharge. Left eye: No discharge. Conjunctiva/sclera: Conjunctivae normal.      Pupils: Pupils are equal, round, and reactive to light. Neck:      Musculoskeletal: Normal range of motion and neck supple. Trachea: No tracheal deviation. Cardiovascular:      Rate and Rhythm: Normal rate and regular rhythm. Heart sounds: Normal heart sounds. No murmur. Pulmonary:      Effort: Pulmonary effort is normal. No respiratory distress. Breath sounds: Normal breath sounds. No wheezing or rales. Abdominal:      General: Bowel sounds are normal.      Palpations: Abdomen is soft. Tenderness: There is no abdominal tenderness. There is no guarding or rebound. Musculoskeletal: Normal range of motion. General: No tenderness or deformity. Skin:     General: Skin is warm and dry. Findings: No erythema or rash. Neurological:      Mental Status: He is alert and oriented to person, place, and time.    Psychiatric:         Behavior: Behavior normal.         Diagnostic Study Results     Labs -   No results found for this or any previous visit (from the past 12 hour(s)). Radiologic Studies -   No orders to display     CT Results  (Last 48 hours)    None        CXR Results  (Last 48 hours)    None            Medical Decision Making   I am the first provider for this patient. I reviewed the vital signs, available nursing notes, past medical history, past surgical history, family history and social history. Vital Signs-Reviewed the patient's vital signs. Patient Vitals for the past 12 hrs:   Temp Pulse Resp BP SpO2   03/26/21 2007 98.7 °F (37.1 °C) 96 18 129/85 100 %           Records Reviewed:   Nursing notes, Prior visits     Provider Notes (Medical Decision Making):   Patient looks well, nontoxic. He appears to be in no acute distress. Headache appears to be benign. Likely viral gastroenteritis causing symptoms. No acute abdomen on exam, no concern for appendicitis. Patient offered IV treatment, refused IV. Will give p.o. Zofran, then give Fioricet, ibuprofen for headache. Patient safe for discharge to home    ED Course:   Initial assessment performed. The patients presenting problems have been discussed, and they are in agreement with the care plan formulated and outlined with them. I have encouraged them to ask questions as they arise throughout their visit. Critical Care Time:   none    Disposition:    DISCHARGE NOTE  Patients results have been reviewed with them. Patient and/or family have verbally conveyed their understanding and agreement of the patient's signs, symptoms, diagnosis, treatment and prognosis and additionally agree to follow up as recommended or return to the Emergency Room should their condition change or have any new concerns prior to their follow-up appointment. Patient verbally agrees with the care-plan and verbally conveys that all of their questions have been answered.    Discharge instructions have also been provided to the patient with some educational information regarding their diagnosis as well a list of reasons why they would want to return to the ER prior to their follow-up appointment should their condition change. PLAN:  1. Current Discharge Medication List      START taking these medications    Details   ondansetron (Zofran ODT) 4 mg disintegrating tablet 1 Tab by SubLINGual route every eight (8) hours as needed for Nausea or Vomiting. Qty: 20 Tab, Refills: 0      butalbital-acetaminophen-caff (Fioricet) -40 mg per capsule Take 1 Cap by mouth every four (4) hours as needed for Headache. Qty: 12 Cap, Refills: 0           2. Follow-up Information     Follow up With Specialties Details Why Contact Info    Sushant Soliz MD Pediatric Medicine Schedule an appointment as soon as possible for a visit   [address] [place] [address],[address]  [facility] [address] [phone]      [facility] EMERGENCY DEPT Emergency Medicine  If symptoms worsen [facility]  [phone]          Return to ED if worse     Diagnosis     Clinical Impression:   1. Nonintractable headache, unspecified chronicity pattern, unspecified headache type    2. Gastroenteritis, acute        Attestations:   This note was completed by Burgess Shay DO

## 2021-04-03 ENCOUNTER — HOSPITAL ENCOUNTER (EMERGENCY)
Age: 21
Discharge: HOME | End: 2021-04-03
Attending: EMERGENCY MEDICINE

## 2021-04-03 VITALS
TEMPERATURE: 98.6 F | BODY MASS INDEX: 18.89 KG/M2 | DIASTOLIC BLOOD PRESSURE: 84 MMHG | WEIGHT: 117.06 LBS | RESPIRATION RATE: 15 BRPM | OXYGEN SATURATION: 100 % | SYSTOLIC BLOOD PRESSURE: 132 MMHG | HEART RATE: 83 BPM

## 2021-04-03 DIAGNOSIS — R10.31 RLQ ABDOMINAL PAIN: ICD-10-CM

## 2021-04-03 DIAGNOSIS — R07.9 CHEST PAIN, UNSPECIFIED TYPE: Primary | ICD-10-CM

## 2021-04-03 DIAGNOSIS — R11.2 NON-INTRACTABLE VOMITING WITH NAUSEA, UNSPECIFIED VOMITING TYPE: ICD-10-CM

## 2021-04-03 LAB
ALBUMIN SERPL-MCNC: 4.7 G/DL (ref 3.5–5)
ALBUMIN/GLOB SERPL: 1.2 {RATIO} (ref 1.1–2.2)
ALP SERPL-CCNC: 100 U/L (ref 45–117)
ALT SERPL-CCNC: 37 U/L (ref 12–78)
ANION GAP SERPL CALC-SCNC: 10 MMOL/L (ref 5–15)
AST SERPL-CCNC: 42 U/L (ref 15–37)
BASOPHILS # BLD: 0 K/UL (ref 0–0.1)
BASOPHILS # BLD: 0 K/UL (ref 0–0.1)
BASOPHILS NFR BLD: 0 % (ref 0–1)
BASOPHILS NFR BLD: 0 % (ref 0–1)
BILIRUB SERPL-MCNC: 1.1 MG/DL (ref 0.2–1)
BUN SERPL-MCNC: 15 MG/DL (ref 6–20)
BUN/CREAT SERPL: 17 (ref 12–20)
CALCIUM SERPL-MCNC: 9.3 MG/DL (ref 8.5–10.1)
CHLORIDE SERPL-SCNC: 98 MMOL/L (ref 97–108)
CO2 SERPL-SCNC: 28 MMOL/L (ref 21–32)
CREAT SERPL-MCNC: 0.88 MG/DL (ref 0.7–1.3)
DIFFERENTIAL METHOD BLD: ABNORMAL
DIFFERENTIAL METHOD BLD: ABNORMAL
EOSINOPHIL # BLD: 0 K/UL (ref 0–0.4)
EOSINOPHIL # BLD: 0 K/UL (ref 0–0.4)
EOSINOPHIL NFR BLD: 0 % (ref 0–7)
EOSINOPHIL NFR BLD: 0 % (ref 0–7)
ERYTHROCYTE [DISTWIDTH] IN BLOOD BY AUTOMATED COUNT: 13 % (ref 11.5–14.5)
ERYTHROCYTE [DISTWIDTH] IN BLOOD BY AUTOMATED COUNT: 13.2 % (ref 11.5–14.5)
GLOBULIN SER CALC-MCNC: 3.8 G/DL (ref 2–4)
GLUCOSE BLD STRIP.AUTO-MCNC: 83 MG/DL (ref 65–100)
GLUCOSE SERPL-MCNC: 71 MG/DL (ref 65–100)
HCT VFR BLD AUTO: 48.4 % (ref 36.6–50.3)
HCT VFR BLD AUTO: 48.9 % (ref 36.6–50.3)
HGB BLD-MCNC: 16.9 G/DL (ref 12.1–17)
HGB BLD-MCNC: 17.2 G/DL (ref 12.1–17)
IMM GRANULOCYTES # BLD AUTO: 0 K/UL (ref 0–0.04)
IMM GRANULOCYTES # BLD AUTO: 0 K/UL (ref 0–0.04)
IMM GRANULOCYTES NFR BLD AUTO: 0 % (ref 0–0.5)
IMM GRANULOCYTES NFR BLD AUTO: 0 % (ref 0–0.5)
LIPASE SERPL-CCNC: 40 U/L (ref 73–393)
LYMPHOCYTES # BLD: 0.8 K/UL (ref 0.8–3.5)
LYMPHOCYTES # BLD: 1.3 K/UL (ref 0.8–3.5)
LYMPHOCYTES NFR BLD: 11 % (ref 12–49)
LYMPHOCYTES NFR BLD: 8 % (ref 12–49)
MAGNESIUM SERPL-MCNC: 2.1 MG/DL (ref 1.6–2.4)
MCH RBC QN AUTO: 32.8 PG (ref 26–34)
MCH RBC QN AUTO: 32.9 PG (ref 26–34)
MCHC RBC AUTO-ENTMCNC: 34.6 G/DL (ref 30–36.5)
MCHC RBC AUTO-ENTMCNC: 35.5 G/DL (ref 30–36.5)
MCV RBC AUTO: 92.5 FL (ref 80–99)
MCV RBC AUTO: 94.8 FL (ref 80–99)
MONOCYTES # BLD: 0.3 K/UL (ref 0–1)
MONOCYTES # BLD: 0.5 K/UL (ref 0–1)
MONOCYTES NFR BLD: 2 % (ref 5–13)
MONOCYTES NFR BLD: 4 % (ref 5–13)
NEUTS SEG # BLD: 10.1 K/UL (ref 1.8–8)
NEUTS SEG # BLD: 9.5 K/UL (ref 1.8–8)
NEUTS SEG NFR BLD: 85 % (ref 32–75)
NEUTS SEG NFR BLD: 90 % (ref 32–75)
NRBC # BLD: 0 K/UL (ref 0–0.01)
NRBC # BLD: 0 K/UL (ref 0–0.01)
NRBC BLD-RTO: 0 PER 100 WBC
NRBC BLD-RTO: 0 PER 100 WBC
PLATELET # BLD AUTO: 220 K/UL (ref 150–400)
PLATELET # BLD AUTO: 237 K/UL (ref 150–400)
PMV BLD AUTO: 9.5 FL (ref 8.9–12.9)
PMV BLD AUTO: 9.5 FL (ref 8.9–12.9)
POTASSIUM SERPL-SCNC: 3.4 MMOL/L (ref 3.5–5.1)
PROT SERPL-MCNC: 8.5 G/DL (ref 6.4–8.2)
RBC # BLD AUTO: 5.16 M/UL (ref 4.1–5.7)
RBC # BLD AUTO: 5.23 M/UL (ref 4.1–5.7)
SERVICE CMNT-IMP: NORMAL
SODIUM SERPL-SCNC: 136 MMOL/L (ref 136–145)
TROPONIN I SERPL-MCNC: <0.05 NG/ML
WBC # BLD AUTO: 10.6 K/UL (ref 4.1–11.1)
WBC # BLD AUTO: 12 K/UL (ref 4.1–11.1)

## 2021-04-03 PROCEDURE — 84484 ASSAY OF TROPONIN QUANT: CPT

## 2021-04-03 PROCEDURE — 83735 ASSAY OF MAGNESIUM: CPT

## 2021-04-03 PROCEDURE — 36415 COLL VENOUS BLD VENIPUNCTURE: CPT

## 2021-04-03 PROCEDURE — 85025 COMPLETE CBC W/AUTO DIFF WBC: CPT

## 2021-04-03 PROCEDURE — 83690 ASSAY OF LIPASE: CPT

## 2021-04-03 PROCEDURE — 93005 ELECTROCARDIOGRAM TRACING: CPT

## 2021-04-03 PROCEDURE — 82962 GLUCOSE BLOOD TEST: CPT

## 2021-04-03 PROCEDURE — 80053 COMPREHEN METABOLIC PANEL: CPT

## 2021-04-03 PROCEDURE — 99284 EMERGENCY DEPT VISIT MOD MDM: CPT

## 2021-04-03 PROCEDURE — 74011250637 HC RX REV CODE- 250/637: Performed by: EMERGENCY MEDICINE

## 2021-04-03 RX ORDER — ONDANSETRON 4 MG/1
4 TABLET, ORALLY DISINTEGRATING ORAL
Qty: 20 TAB | Refills: 0 | OUTPATIENT
Start: 2021-04-03 | End: 2022-11-02

## 2021-04-03 RX ORDER — BUTALBITAL, ACETAMINOPHEN AND CAFFEINE 50; 325; 40 MG/1; MG/1; MG/1
1 TABLET ORAL
Status: COMPLETED | OUTPATIENT
Start: 2021-04-03 | End: 2021-04-03

## 2021-04-03 RX ORDER — ONDANSETRON 2 MG/ML
4 INJECTION INTRAMUSCULAR; INTRAVENOUS
Status: DISCONTINUED | OUTPATIENT
Start: 2021-04-03 | End: 2021-04-03

## 2021-04-03 RX ADMIN — BUTALBITAL, ACETAMINOPHEN, AND CAFFEINE 1 TABLET: 50; 325; 40 TABLET ORAL at 18:12

## 2021-04-03 NOTE — ED NOTES
Initial lab draw came from existing IV, after waste tube - per ED tech, Xander Solorio. Several labs in [facility] came back crucially low, indicating possible dilution. Will reorder and recollect lab specimens via new venipuncture site.

## 2021-04-03 NOTE — Clinical Note
[address]  [facility] EMERGENCY DEPT  [address] [place] [address]  David Bagley [address] [phone]    Work/School Note    Date: 4/3/2021    To Whom It May concern:    Jovi Gil III was seen and treated today in the emergency room by the following provider(s):  Attending Provider: Micheline Abebe MD.      Shyann White is excused from work/school on 04/03/21 and 04/04/21. He is medically clear to return to work/school on 4/5/2021.        Sincerely,          Angelica Wray MD

## 2021-04-03 NOTE — ED PROVIDER NOTES
EMERGENCY DEPARTMENT HISTORY AND PHYSICAL EXAM      Date: 4/3/2021  Patient Name: Deborah Hollingsworth III    History of Presenting Illness     Chief Complaint   Patient presents with    Abdominal Pain     Presents to the ED via EMS with c/o RLQ abdominal pain, HA, vomiting x 3 hours. Dark colored BM - he has a picture for reference.  Vomiting       History Provided By: Patient    HPI: Enmanuel Kapoor, 23 y.o. male with history of migraine headaches, tobacco use presents to the ED with cc of nausea, vomiting, lower abdominal pain, headache. Symptoms onset this morning. Patient believes this was because he drank [place] lot\" of alcohol last night both beer and liquor. He endorses multiple episodes of nonbloody nonbilious emesis, nausea, poor p.o. intake, and lower abdominal pain most notably in the right lower quadrant. He has developed a headache since he has been here in the ED. Lower abdominal pain described as mild, dull and cramping, it is intermittent. Nonradiating. It has resolved since arrival here in the ED. Since arrival in the ED he has now developed chest pain described as sharp and pinching located left chest, intermittent without aggravating or alleviating factors. He denies any fevers, chills, shortness of breath, urinary symptoms, change in bowel habits. There are no other complaints, changes, or physical findings at this time. PCP: None    No current facility-administered medications on file prior to encounter. Current Outpatient Medications on File Prior to Encounter   Medication Sig Dispense Refill    butalbital-acetaminophen-caff (Fioricet) -40 mg per capsule Take 1 Cap by mouth every four (4) hours as needed for Headache. 12 Cap 0    ibuprofen (MOTRIN) 600 mg tablet Take 1 Tab by mouth every six (6) hours as needed for Pain.  20 Tab 0       Past History     Past Medical History:  Past Medical History:   Diagnosis Date    Migraines        Past Surgical History:  No past surgical history on file. Family History:  No family history on file. Social History:  Social History     Tobacco Use    Smoking status: Current Every Day Smoker     Packs/day: 0.25    Smokeless tobacco: Current User    Tobacco comment: Black and Milds   Substance Use Topics    Alcohol use: Yes    Drug use: Yes     Types: Marijuana       Allergies:  No Known Allergies      Review of Systems   Review of Systems   Constitutional: Positive for appetite change. Negative for chills, fatigue and fever. Respiratory: Negative for cough and shortness of breath. Cardiovascular: Positive for chest pain. Negative for leg swelling. Gastrointestinal: Positive for abdominal pain, nausea and vomiting. Negative for constipation and diarrhea. Genitourinary: Negative. Musculoskeletal: Negative for back pain and gait problem. Skin: Negative for color change and rash. Neurological: Positive for headaches. Negative for dizziness, weakness and light-headedness. Hematological: Does not bruise/bleed easily. All other systems reviewed and are negative. Physical Exam   Physical Exam  Vitals signs and nursing note reviewed. Constitutional:       General: He is not in acute distress. Appearance: Normal appearance. He is not ill-appearing or toxic-appearing. Comments: Sitting upright in bed in no acute distress. HENT:      Head: Normocephalic and atraumatic. Nose: Nose normal.      Mouth/Throat:      Mouth: Mucous membranes are moist.   Eyes:      Extraocular Movements: Extraocular movements intact. Pupils: Pupils are equal, round, and reactive to light. Neck:      Musculoskeletal: Normal range of motion and neck supple. Cardiovascular:      Rate and Rhythm: Normal rate and regular rhythm. Heart sounds: No murmur. Pulmonary:      Effort: Pulmonary effort is normal. No respiratory distress. Breath sounds: Normal breath sounds. No wheezing.    Abdominal:      General: There is no distension. Palpations: Abdomen is soft. Tenderness: There is no abdominal tenderness. There is no guarding or rebound. Hernia: No hernia is present. Musculoskeletal: Normal range of motion. General: No swelling or tenderness. Right lower leg: No edema. Left lower leg: No edema. Skin:     General: Skin is warm and dry. Coloration: Skin is not pale. Findings: No erythema. Neurological:      General: No focal deficit present. Mental Status: He is alert and oriented to person, place, and time. Diagnostic Study Results     Labs -     Labs Reviewed   CBC WITH AUTOMATED DIFF - Abnormal; Notable for the following components:       Result Value    HGB 17.2 (*)     NEUTROPHILS 90 (*)     LYMPHOCYTES 8 (*)     MONOCYTES 2 (*)     ABS. NEUTROPHILS 9.5 (*)     All other components within normal limits   CBC WITH AUTOMATED DIFF - Abnormal; Notable for the following components:    WBC 12.0 (*)     NEUTROPHILS 85 (*)     LYMPHOCYTES 11 (*)     MONOCYTES 4 (*)     ABS. NEUTROPHILS 10.1 (*)     All other components within normal limits   METABOLIC PANEL, COMPREHENSIVE - Abnormal; Notable for the following components:    Potassium 3.4 (*)     Bilirubin, total 1.1 (*)     AST (SGOT) 42 (*)     Protein, total 8.5 (*)     All other components within normal limits   LIPASE - Abnormal; Notable for the following components:    Lipase 40 (*)     All other components within normal limits   MAGNESIUM   TROPONIN I   GLUCOSE, POC       Radiologic Studies -   No orders to display     CT Results  (Last 48 hours)    None        CXR Results  (Last 48 hours)    None          Medical Decision Making   I am the first provider for this patient. I reviewed the vital signs, available nursing notes, past medical history, past surgical history, family history and social history. Vital Signs-Reviewed the patient's vital signs.   Visit Vitals  /84 (BP 1 Location: Left arm, BP Patient Position: At rest)   Pulse 83   Temp 98.6 °F (37 °C)   Resp 15   Wt 53.1 kg (117 lb 1 oz)   SpO2 100%   BMI 18.89 kg/m²       Records Reviewed: Nursing Notes    Provider Notes (Medical Decision Making):   17-year-old male here with nausea, vomiting, lower abdominal pain, headache, chest pain after drinking significant amount of alcohol yesterday. Appears clinically well and nontoxic. He is afebrile and vital signs are stable. He is tolerating p.o. here in the ED. He does complain of the right lower quadrant pain that has resolved since arrival in the ED. On my evaluation entire abdomen is nontender, soft, benign, and nonperitoneal.  With serial abdominal exams there remains no tenderness particularly in the right lower quadrant. I have less of a concern for appendicitis given his resolution of pain, however with leukocytosis 12,000 (which may be related to reactive from nausea and vomiting) CT was offered. Patient does not like the IV in his arm and has already removed it, he is refusing further IV sticks. He does not want CT scan. He is requesting p.o. medication for headache now and would like to have his heart checked out due to chest pain that has onset here in the ED. EKG is nonischemic, chest x-ray unremarkable, troponin negative. I do not have high concern for acute ACS. On reassessment patient is feeling better after p.o. meds, able to tolerate p.o. food. Encouraged alcohol and tobacco cessation, encouraged to follow-up with PCP and given strict return ED precautions. ED Course:   Initial assessment performed. The patients presenting problems have been discussed, and they are in agreement with the care plan formulated and outlined with them. I have encouraged them to ask questions as they arise throughout their visit.     ED Course as of Apr 06 1117   Sat Apr 03, 2021   1836 EKG per my interpretation normal sinus rhythm, rate 98 bpm, rightward axis, no acute ischemic changes, no interval changes. [AK]      ED Course User Index  [AK] Lemuel Beard MD       TOBACCO COUNSELING:  Upon evaluation, the patient expressed that they are a current tobacco user. For 4 minutes, the patient has been counseled on the dangers of smoking and was encouraged to quit as soon as possible in order to decrease further risks to their health including heart disease, kidney disease, lung disease, and risk of MI/CVA. Patient has conveyed their understanding of the risks involved should they continue to use tobacco products. Patient has been offered various outpatient resources to help with their tobacco dependence including discussion with PCP for medication assistance and behavioral therapies. Patient has expressed no interest in quitting. Cardiac Monitoring: The cardiac monitor revealed the following rhythm as interpreted by me: Normal Sinus Rhythm     The cardiac monitor was ordered secondary to the patient's reported complaint of chest pain and to monitor the patient for dysrhythmia. Sam Lozano MD      Discharge Note:  The patient has been re-evaluated and is ready for discharge. Reviewed available results with patient. Counseled patient on diagnosis and care plan. Patient has expressed understanding, and all questions have been answered. Patient agrees with plan and agrees to follow up as recommended, or to return to the ED if their symptoms worsen. Discharge instructions have been provided and explained to the patient, along with reasons to return to the ED. Disposition:  Discharge      DISCHARGE PLAN:  1.    Discharge Medication List as of 4/3/2021  8:21 PM      CONTINUE these medications which have CHANGED    Details   ondansetron (Zofran ODT) 4 mg disintegrating tablet 1 Tab by SubLINGual route every eight (8) hours as needed for Nausea or Vomiting., Normal, Disp-20 Tab, R-0         CONTINUE these medications which have NOT CHANGED    Details   butalbital-acetaminophen-caff (Fioricet) -40 mg per capsule Take 1 Cap by mouth every four (4) hours as needed for Headache., Normal, Disp-12 Cap, R-0      ibuprofen (MOTRIN) 600 mg tablet Take 1 Tab by mouth every six (6) hours as needed for Pain., Print, Disp-20 Tab, R-0           2. Follow-up Information     Follow up With Specialties Details Why [id number] Internal Medicine Schedule an appointment as soon as possible for a visit  to establish with a PCP Elysia Chao  [address] [place] [id number] [phone]    [facility] EMERGENCY DEPT Emergency Medicine Go to  As needed, If symptoms worsen [address]  [address] [facility]  [phone]        3. Return to ED if worse     Diagnosis     Clinical Impression:   1. Chest pain, unspecified type    2. Non-intractable vomiting with nausea, unspecified vomiting type    3. RLQ abdominal pain        Attestations:  I am the first and primary provider of record for this patient's ED encounter. I personally performed the services described above in this documentation. Rip Kenyon MD    Please note that this dictation was completed with [employer], the [employer] voice recognition software. Quite often unanticipated grammatical, syntax, homophones, and other interpretive errors are inadvertently transcribed by the computer software. Please disregard these errors. Please excuse any errors that have escaped final proofreading. Thank you.

## 2021-04-03 NOTE — ED NOTES
Skin warm and dry. Respirations even and unlabored. In no apparent distress at this time. POC glucose is 83, whereas initial CMP shows a result of 45, further suggesting specimen dilution. Labs redrawn via venipuncture.

## 2021-04-03 NOTE — ED NOTES
Pt reports to ED with c/o of vomiting for 3 hours today, accompanied by headache pain 101/10. Pt is alert/orented x 4, placed on the monitor x 2. Pt is in position of comfort with call bell within reach.

## 2021-04-04 LAB
ATRIAL RATE: 98 BPM
CALCULATED P AXIS, ECG09: 66 DEGREES
CALCULATED R AXIS, ECG10: 82 DEGREES
CALCULATED T AXIS, ECG11: 60 DEGREES
DIAGNOSIS, 93000: NORMAL
P-R INTERVAL, ECG05: 128 MS
Q-T INTERVAL, ECG07: 350 MS
QRS DURATION, ECG06: 74 MS
QTC CALCULATION (BEZET), ECG08: 446 MS
VENTRICULAR RATE, ECG03: 98 BPM

## 2021-04-04 NOTE — DISCHARGE INSTRUCTIONS
You were evaluated in the emergency department for nausea, vomiting, abdominal pain, and chest pain. Your examination was reassuring as was your work-up including blood work, EKG, and abdominal pain. It will be important for you to follow-up with your primary care physician in 7-14 days as needed if your symptoms do not improve. If you develop worsening symptoms such as fevers, can't keep anything down, or worsening right lower quadrant pain, please return to the emergency department immediately.

## 2022-04-24 ENCOUNTER — HOSPITAL ENCOUNTER (EMERGENCY)
Age: 22
Discharge: HOME | End: 2022-04-24
Attending: EMERGENCY MEDICINE

## 2022-04-24 ENCOUNTER — APPOINTMENT (OUTPATIENT)
Dept: GENERAL RADIOLOGY | Age: 22
End: 2022-04-24
Attending: NURSE PRACTITIONER

## 2022-04-24 VITALS
HEIGHT: 66 IN | DIASTOLIC BLOOD PRESSURE: 71 MMHG | TEMPERATURE: 98.3 F | BODY MASS INDEX: 20.09 KG/M2 | OXYGEN SATURATION: 96 % | WEIGHT: 125 LBS | RESPIRATION RATE: 18 BRPM | SYSTOLIC BLOOD PRESSURE: 116 MMHG | HEART RATE: 96 BPM
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
## 2022-04-24 DIAGNOSIS — S01.81XA FACIAL LACERATION, INITIAL ENCOUNTER: Primary | ICD-10-CM

## 2022-04-24 DIAGNOSIS — S02.2XXA CLOSED FRACTURE OF NASAL BONE, INITIAL ENCOUNTER: ICD-10-CM

## 2022-04-24 PROCEDURE — 75810000293 HC SIMP/SUPERF WND  RPR

## 2022-04-24 PROCEDURE — 74011250636 HC RX REV CODE- 250/636: Performed by: NURSE PRACTITIONER

## 2022-04-24 PROCEDURE — 90471 IMMUNIZATION ADMIN: CPT

## 2022-04-24 PROCEDURE — 99284 EMERGENCY DEPT VISIT MOD MDM: CPT

## 2022-04-24 PROCEDURE — 90715 TDAP VACCINE 7 YRS/> IM: CPT | Performed by: NURSE PRACTITIONER

## 2022-04-24 PROCEDURE — 74011000250 HC RX REV CODE- 250: Performed by: NURSE PRACTITIONER

## 2022-04-24 PROCEDURE — 70160 X-RAY EXAM OF NASAL BONES: CPT

## 2022-04-24 RX ORDER — BACITRACIN 500 UNIT/G
1 PACKET (EA) TOPICAL ONCE
Status: DISCONTINUED | OUTPATIENT
Start: 2022-04-24 | End: 2022-04-24 | Stop reason: HOSPADM

## 2022-04-24 RX ORDER — LIDOCAINE HYDROCHLORIDE 20 MG/ML
10 INJECTION, SOLUTION INFILTRATION; PERINEURAL
Status: COMPLETED | OUTPATIENT
Start: 2022-04-24 | End: 2022-04-24

## 2022-04-24 RX ORDER — IBUPROFEN 800 MG/1
800 TABLET ORAL
Qty: 20 TABLET | Refills: 0 | Status: SHIPPED | OUTPATIENT
Start: 2022-04-24 | End: 2022-05-01

## 2022-04-24 RX ADMIN — LIDOCAINE HYDROCHLORIDE 200 MG: 20 INJECTION, SOLUTION INFILTRATION; PERINEURAL at 14:55

## 2022-04-24 RX ADMIN — TETANUS TOXOID, REDUCED DIPHTHERIA TOXOID AND ACELLULAR PERTUSSIS VACCINE, ADSORBED 0.5 ML: 5; 2.5; 8; 8; 2.5 SUSPENSION INTRAMUSCULAR at 14:29

## 2022-04-24 NOTE — LETTER
[facility] EMERGENCY DEPT  [address] [facility] [id number] [phone]    Work/School Note    Date: 4/24/2022    To Whom It May concern:    Britton Willett III was seen and treated today in the emergency room by the following provider(s):  Nurse Practitioner: Ingrid Currie NP. Ivonne Galvan is excused from work/school on 04/24/2022 and 04/25/2022. He is medically clear to return to work/school on 04/26/2022.        Sincerely,          Edita Cross
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
## 2022-04-24 NOTE — ED NOTES
Patient is alert and oriented x 4 and in no acute distress at this time. Respirations are at a regular rate, depth, and pattern. Patient updated on plan of care and has no questions or concerns at this time. Call bell within reach. Will continue to monitor. Please reference nursing assessment. Emergency Department Nursing Plan of Care       The Nursing Plan of Care is developed from the Nursing assessment and Emergency Department Attending provider initial evaluation. The plan of care may be reviewed in the ED Provider note.     The Plan of Care was developed with the following considerations:   Patient / Family readiness to learn indicated by:verbalized understanding and successful return demonstration  Persons(s) to be included in education: patient  Barriers to Learning/Limitations:No    Signed     Ariel Islas RN    4/24/2022   2:00 PM
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
## 2022-11-02 ENCOUNTER — APPOINTMENT (OUTPATIENT)
Dept: GENERAL RADIOLOGY | Age: 22
End: 2022-11-02
Attending: EMERGENCY MEDICINE

## 2022-11-02 ENCOUNTER — HOSPITAL ENCOUNTER (EMERGENCY)
Age: 22
Discharge: HOME | End: 2022-11-02
Attending: EMERGENCY MEDICINE

## 2022-11-02 VITALS
HEART RATE: 74 BPM | TEMPERATURE: 98.5 F | RESPIRATION RATE: 19 BRPM | WEIGHT: 121.47 LBS | BODY MASS INDEX: 19.52 KG/M2 | SYSTOLIC BLOOD PRESSURE: 123 MMHG | DIASTOLIC BLOOD PRESSURE: 84 MMHG | OXYGEN SATURATION: 99 % | HEIGHT: 66 IN

## 2022-11-02 DIAGNOSIS — R11.2 NAUSEA AND VOMITING, UNSPECIFIED VOMITING TYPE: Primary | ICD-10-CM

## 2022-11-02 DIAGNOSIS — R19.7 DIARRHEA, UNSPECIFIED TYPE: ICD-10-CM

## 2022-11-02 PROCEDURE — U0005 INFEC AGEN DETEC AMPLI PROBE: HCPCS

## 2022-11-02 PROCEDURE — 99284 EMERGENCY DEPT VISIT MOD MDM: CPT

## 2022-11-02 PROCEDURE — 71046 X-RAY EXAM CHEST 2 VIEWS: CPT

## 2022-11-02 PROCEDURE — 74011250636 HC RX REV CODE- 250/636: Performed by: EMERGENCY MEDICINE

## 2022-11-02 RX ORDER — ONDANSETRON 4 MG/1
4 TABLET, ORALLY DISINTEGRATING ORAL
Status: COMPLETED | OUTPATIENT
Start: 2022-11-02 | End: 2022-11-02

## 2022-11-02 RX ORDER — ONDANSETRON 4 MG/1
4 TABLET, ORALLY DISINTEGRATING ORAL
Qty: 15 TABLET | Refills: 0 | Status: SHIPPED | OUTPATIENT
Start: 2022-11-02

## 2022-11-02 RX ADMIN — ONDANSETRON 4 MG: 4 TABLET, ORALLY DISINTEGRATING ORAL at 14:19

## 2022-11-02 NOTE — ED NOTES
Discharge instructions were given to the patient by Fabricio Manuel RN  . The patient left the Emergency Department ambulatory, alert and oriented and in no acute distress with 1 prescription. The patient was encouraged to call or return to the ED for worsening issues or problems and was encouraged to schedule a follow up appointment for continuing care. The patient verbalized understanding of discharge instructions and prescriptions, all questions were answered. The patient has no further concerns at this time.

## 2022-11-02 NOTE — ED PROVIDER NOTES
Patient:   RITA CABRAL            MRN: [id number]            FIN: [id number]              Age:   55 years     Sex:  FEMALE     :  59   Associated Diagnoses:   None   Author:   YOSEPH RIVERS     Chief Complaint   AV fistula malfunction     History of Present Illness   This is a 55 year old female with medical history significant for type 2 DM, ESRD on HD, HTN, DLD, PAD, and CVA, admitted for clotted AF fistula. At the time of interview, the patient is devoid of current complaints.      Review of Systems   Full review conducted and is negative other than as that related here above     Histories   Past Med History: Past Medical History   CVA (cerebral infarction)  Depression  Diabetes  ESRD (end stage renal disease)  Fatigue  HLD (hyperlipidemia)  HTN (hypertension)  Peripheral artery disease  Risk factors for obstructive sleep apnea    Family History:    No family history items have been selected or recorded.   Procedure History:     section ().  Left AVF.  implanted chest permacath.   Social History       Alcohol  Details: Use: Past.  Exercise  Details: Exercise: Never.  Sexual  Details: Sexual orientation: Straight or heterosexual.  Gender Identity: Identifies as female.  Gender on Ins: Female.  Substance Abuse  Details: Use: None.  Tobacco  Details: Smoked/Smokeless Tobacco Last 30 Days: No.  Use: Never smoker.  Cultural/Yazidi Practices  Details: Yazidi or Cultural Practices: None.  .       Health Status   Allergies:    Allergic Reactions (All)  No Known Medication Allergies   Current medications:  (Selected)   Inpatient Medications  Ordered  NIFEdipine oral 30 mg XL tablet: 30 mg = 1 tab, Oral, Daily, Routine, Order Start: 19 9:24:00, Tab XL  acetaminophen oral 325 mg tablet (Tylenol): 650 mg = 2 tab, Oral, Q4H, PRN pain moderate, Routine, Order Start: 19 18:08:00, Tab  calcitriol (vitamin D3) oral 0.25 mcg capsule: 0.25 mcg = 1 cap, Oral, Daily,  EMERGENCY DEPARTMENT HISTORY AND PHYSICAL EXAM      Date: 11/2/2022  Patient Name: Alex Schulte III    History of Presenting Illness     Chief Complaint   Patient presents with    Vomiting     Per pt reports dizziness that started this morning, +vomiting, chills and diarrhea, denies abdominal pain. History Provided By: Patient    HPI: Alex Schulte III, 23 y.o. male with PMHx as noted below presents the emergency department chief complaint of nausea, vomiting, chills, cough, diarrhea, abdominal cramping. Patient reported onset of symptoms yesterday. Symptoms been constant, mild to moderate in intensity. Patient does work in . Abdominal pain currently absent. There is been no blood in the diarrhea. No radiation of symptoms. Pt denies any other alleviating or exacerbating factors. Additionally, pt specifically denies any recent fever,headache,  CP, SOB, numbness, weakness, BLE swelling, heart palpitations, urinary sxs,  constipation, melena, hematochezia,     PCP: None    Current Outpatient Medications   Medication Sig Dispense Refill    ondansetron (ZOFRAN ODT) 4 mg disintegrating tablet Take 1 Tablet by mouth every eight (8) hours as needed for Nausea or Vomiting. 15 Tablet 0    butalbital-acetaminophen-caff (Fioricet) -40 mg per capsule Take 1 Cap by mouth every four (4) hours as needed for Headache. (Patient not taking: No sig reported) 12 Cap 0       Past History     Past Medical History:  Past Medical History:   Diagnosis Date    Migraines        Past Surgical History:  History reviewed. No pertinent surgical history. Family History:  History reviewed. No pertinent family history.     Social History:  Social History     Tobacco Use    Smoking status: Every Day     Packs/day: 0.25     Types: Cigarettes    Smokeless tobacco: Current    Tobacco comments:     Black and Milds   Substance Use Topics    Alcohol use: Yes     Comment: reports 3 liters daily    Drug use: Yes     Types: Marijuana       Allergies:  No Known Allergies      Review of Systems   Review of Systems  Constitutional: Negative for fever. Positive chills, and fatigue. HENT: Negative for congestion, sore throat, rhinorrhea, sneezing and neck stiffness   Eyes: Negative for discharge and redness. Respiratory: Negative for  shortness of breath, wheezing   Cardiovascular: Negative for chest pain, palpitations   Gastrointestinal: Positive for nausea, vomiting, abdominal pain, diarrhea. Positive blood in stool. Genitourinary: Negative for dysuria, hematuria, flank pain, decreased urine volume, discharge,   Musculoskeletal: Negative for myalgias or joint pain . Skin: Negative for rash or lesions . Neurological: Negative weakness, light-headedness, numbness and headaches. Physical Exam   Physical Exam    GENERAL: alert and oriented, no acute distress  EYES: PEERL, No injection, discharge or icterus. ENT: Mucous membranes pink and moist.  NECK: Supple  LUNGS: Airway patent. Non-labored respirations. Breath sounds clear with good air entry bilaterally. HEART: Regular rate and rhythm. No peripheral edema  ABDOMEN: Non-distended and non-tender, without guarding or rebound. SKIN:  warm, dry  MSK/EXTREMITIES: Without swelling, tenderness or deformity, symmetric with normal ROM  NEUROLOGICAL: Alert, oriented      Diagnostic Study Results     Labs -   No results found for this or any previous visit (from the past 12 hour(s)). Radiologic Studies -   XR CHEST PA LAT   Final Result   1. No acute cardiopulmonary disease            CT Results  (Last 48 hours)      None          CXR Results  (Last 48 hours)                 11/02/22 1351  XR CHEST PA LAT Final result    Impression:  1. No acute cardiopulmonary disease           Narrative:  INDICATION:  cough        Exam: Chest 2 views. Comparison: 1/23/2019. Findings: Cardiomediastinal silhouette is normal. Pulmonary vasculature is not   engorged.  No focal Routine, Order Start: 04/23/19 9:23:00, Cap  carvedilol oral 12.5 mg tablet: 12.5 mg = 1 tab, Oral, BID, Hold for HR less than 55, and/or SBP less than 110, Routine, Order Start: 04/23/19 9:24:00, Tab  insulin NPH-insulin regular (HumuLIN 70/30 / NovoLIN 70/30)  [auto-sub to HumaLOG Mix 75/25]: 40 unit = 0.4 mL, Subcutaneous, BID [before breakfast & dinner], RATE: 0 mL/hr, Infuse over 0 hr, 0.4 mL TOTAL Volume, Routine, Order Start: 04/23/19 16:00:00, Injection, Sub 1:1 unit for NovoLIN 70/30 / HumuLIN 70/30.  Documented Medications  Documented  HumuLIN 70/30 (insulin isophane-regular) subcutaneous suspension: 40 unit = 0.4 mL, BID [before breakfast & dinner], Maintenance  NIFEdipine oral 30 mg XL tablet: 30 mg = 1 tab, Oral, Daily, Tab XL, Maintenance  calcitriol (vitamin D3) oral 0.25 mcg capsule: 0.25 mcg = 1 cap, Oral, Daily, Cap, Maintenance  carvedilol oral 12.5 mg tablet: 12.5 mg = 1 tab, Oral, BID, Tab, Maintenance,   Medications (5) Active  Scheduled: (4)  Calcitriol 0.25 mcg cap  0.25 mcg 1 cap, Oral, Daily  Carvedilol 12.5 mg tab  12.5 mg 1 tab, Oral, BID  insulin lispro protamine-insulin lispro 75-25  40 unit 0.4 mL, Subcutaneous, BID [before breakfast & dinner]  NIFEdipine 30 mg XL tab  30 mg 1 tab, Oral, Daily  Continuous: (0)  PRN: (1)  Acetaminophen 325 mg tab  650 mg 2 tab, Oral, Q4H      Physical Examination   VS/Measurements       Vitals between:   22-APR-2019 22:00:52   TO   23-APR-2019 22:00:52                   LAST RESULT MINIMUM MAXIMUM  Temperature 36.2 36.2 37.0  Heart Rate 98 89 103  Respiratory Rate 19 17 19  NISBP           130 119 164  NIDBP           88 78 104  NIMBP           102 92 120  SpO2                    97 95 98  , Measurements from flowsheet : Height and Weight   04/22/19 22:31 CLINICALWEIGHT 94.3 kg    Weight Method Measured    MEDDOSEWT 94.3 kg    CLINICALHEIGHT 160 cm    Height Method Stated    Weight Scale Bed    BSA - Medical History 1.97    BMI-Medical History 36.8  kg/m2   04/22/19 17:21 CLINICALWEIGHT 95.3 kg    MEDDOSEWT 95.3 kg       GENERAL: awake, alert; no apparent distress  HENT: NC/AT, oropharynx clear, moist mucous membranes  EYES: conjugate gaze  RESP: lungs clear to auscultation bilaterally  CVS: S1 & S2 normal, regular rate & rhythm; no peripheral edema  ABD: soft, non-tender, non-distended, normoactive bowel sounds  NEURO: CN II-XII grossly intact, no focal deficits        Review / Management   Laboratory results:       Labs between:  22-APR-2019 22:00 to 23-APR-2019 22:00    CBC:                 WBC  HgB  Hct  Plt  MCV  RDW   23-APR-2019 7.4  (L) 11.1  (L) 32.2  252  92.0  (H) 15.6     BMP:                 Na  Cl  BUN  Glu   23-APR-2019 (L) 133  (L) 96  (H) 85  (H) 192                              K  CO2  Cr  Ca                              4.7  21  (H) 11.48  9.5     POC GLU:                 Latest Result  Latest Date  Minimum  Min Date  Maximum  Max Date                             (H) 200  23-APR-2019 (H) 200  23-APR-2019 (H) 234  22-APR-2019    COAG:                 INR  PT  PTT  Ddimer  Fibrinogen    23-APR-2019 0.9  9.8                        .    Labs/Diagnostics: reviewed     Impression and Plan   1. Clotted AV Fistula - IR declotting later today  2. HTN - resume home antihypertensives  3. Type 2 DM - resume home insulin regimen  4. Anemia of Chronic Disease - no evidence of active bleeding; continue to monitor  5. Hx of CVA? - unclear why patient not on ASA and statin    Mingo Gipson MD   parenchymal opacities, effusions, or pneumothorax. Bony   thorax is intact. Medical Decision Making     I, Leandro Valderrama MD am the first provider for this patient and am the attending of record for this patient encounter. I reviewed the vital signs, available nursing notes, past medical history, past surgical history, family history and social history. Vital Signs-Reviewed the patient's vital signs. Patient Vitals for the past 12 hrs:   Temp Pulse Resp BP SpO2   11/02/22 1154 98.5 °F (36.9 °C) 74 19 123/84 99 %       Records Reviewed: Nursing Notes and Old Medical Records    Provider Notes (Medical Decision Making):   Patient presents to the emergency room for abd pain, vomiting and diarrhea. On presentation, the patient is well appearing, in no acute distress with normal vital signs Differential diagnosis considered includes gastroenteritis, gastritis, enteritis, inflammatory bowel disease, bowel obstruction, COVID , pancreatitis, cholecystitis, appendicitis, c.diff colitis, travelers diarrhea, diverticulitis, cyclic vomiting syndrome, or a food-borne illness. Abdominal examination was soft and benign so no concern for any acute surgical issues at presentation. Clinical history, examination, and work-up are consistent with a likely self-limited gastrointestinal process, likely viral     Patient was provided anti-emetics with overall improvement in symptoms. The patient was able to tolerate oral intake prior to discharge. Stools were reportedly non-bloody and no antibiotics were felt to be warranted at this time. Patient is encouraged to maintain hydration. A prescription for anti-emetics was provided. CT scan was not felt to be warranted at this time as he is having no current pain, abdomen benign on reassessment. Patient should return for severe pain, intractable vomiting, fevers, bloody stools, or dehydration.   The patient understood the diagnosis and treatment and had no further questions. Patient should call a primary care physician for follow-up, and was otherwise deemed stable for discharge to home. ED Course:   Initial assessment performed. The patients presenting problems have been discussed, and they are in agreement with the care plan formulated and outlined with them. I have encouraged them to ask questions as they arise throughout their visit. Medications   ondansetron (ZOFRAN ODT) tablet 4 mg (4 mg Oral Given 11/2/22 8319)         PROGRESS  Connor Acuna III's  results have been reviewed with him. He has been counseled regarding his diagnosis. He verbally conveys understanding and agreement of the signs, symptoms, diagnosis, treatment and prognosis and additionally agrees to follow up as recommended with Dr. None in 24 - 48 hours. He also agrees with the care-plan and conveys that all of his questions have been answered. I have also put together some discharge instructions for him that include: 1) educational information regarding their diagnosis, 2) how to care for their diagnosis at home, as well a 3) list of reasons why they would want to return to the ED prior to their follow-up appointment, should their condition change. Disposition:  home    PLAN:  1. Current Discharge Medication List        START taking these medications    Details   ondansetron (ZOFRAN ODT) 4 mg disintegrating tablet Take 1 Tablet by mouth every eight (8) hours as needed for Nausea or Vomiting. Qty: 15 Tablet, Refills: 0  Start date: 11/2/2022           2. Follow-up Information       Follow up With Specialties Details Why [address] [facility] EMERGENCY DEPT Emergency Medicine  If symptoms worsen Peggy Gatica          Return to ED if worse     Diagnosis     Clinical Impression:   1. Nausea and vomiting, unspecified vomiting type    2.  Diarrhea, unspecified type        Please note that this dictation was completed with Kirsty computer voice recognition software. Quite often unanticipated grammatical, syntax, homophones, and other interpretive errors are inadvertently transcribed by the computer software. Please disregard these errors. Additionally, please excuse any errors that have escaped final proofreading.

## 2022-11-02 NOTE — Clinical Note
[facility] EMERGENCY DEPT  [address] [facility] [address] [facility] [address] [phone]    Work/School Note    Date: 11/2/2022    To Whom It May concern:    Mac Damian III was seen and treated today in the emergency room by the following provider(s):  Attending Provider: Aditya Kaye MD.      Arin Pittman is excused from work/school on 11/2/2022 through 11/4/2022. He is medically clear to return to work/school on 11/5/2022.          Sincerely,          Sarai Lobo MD

## 2022-11-03 LAB
SARS-COV-2, XPLCVT: NOT DETECTED
SOURCE, COVRS: NORMAL

## 2023-02-08 ENCOUNTER — HOSPITAL ENCOUNTER (EMERGENCY)
Age: 23
Discharge: HOME | End: 2023-02-08
Attending: STUDENT IN AN ORGANIZED HEALTH CARE EDUCATION/TRAINING PROGRAM

## 2023-02-08 VITALS
TEMPERATURE: 98.7 F | WEIGHT: 125 LBS | SYSTOLIC BLOOD PRESSURE: 134 MMHG | OXYGEN SATURATION: 100 % | RESPIRATION RATE: 16 BRPM | BODY MASS INDEX: 20.18 KG/M2 | DIASTOLIC BLOOD PRESSURE: 90 MMHG | HEART RATE: 91 BPM

## 2023-02-08 DIAGNOSIS — E86.0 DEHYDRATION: Primary | ICD-10-CM

## 2023-02-08 DIAGNOSIS — N34.2 URETHRITIS: ICD-10-CM

## 2023-02-08 DIAGNOSIS — R11.2 NAUSEA AND VOMITING, UNSPECIFIED VOMITING TYPE: ICD-10-CM

## 2023-02-08 LAB
ALBUMIN SERPL-MCNC: 4.1 G/DL (ref 3.5–5)
ALBUMIN/GLOB SERPL: 1.1 (ref 1.1–2.2)
ALP SERPL-CCNC: 98 U/L (ref 45–117)
ALT SERPL-CCNC: 22 U/L (ref 12–78)
ANION GAP SERPL CALC-SCNC: 10 MMOL/L (ref 5–15)
APPEARANCE UR: CLEAR
AST SERPL-CCNC: 25 U/L (ref 15–37)
BACTERIA URNS QL MICRO: NEGATIVE /HPF
BASOPHILS # BLD: 0 K/UL (ref 0–0.1)
BASOPHILS NFR BLD: 0 % (ref 0–1)
BILIRUB SERPL-MCNC: 0.6 MG/DL (ref 0.2–1)
BILIRUB UR QL: NEGATIVE
BUN SERPL-MCNC: 10 MG/DL (ref 6–20)
BUN/CREAT SERPL: 12 (ref 12–20)
C TRACH DNA SPEC QL NAA+PROBE: POSITIVE
CALCIUM SERPL-MCNC: 8.8 MG/DL (ref 8.5–10.1)
CHLORIDE SERPL-SCNC: 103 MMOL/L (ref 97–108)
CO2 SERPL-SCNC: 31 MMOL/L (ref 21–32)
COLOR UR: ABNORMAL
CREAT SERPL-MCNC: 0.84 MG/DL (ref 0.7–1.3)
DIFFERENTIAL METHOD BLD: ABNORMAL
EOSINOPHIL # BLD: 0 K/UL (ref 0–0.4)
EOSINOPHIL NFR BLD: 0 % (ref 0–7)
EPITH CASTS URNS QL MICRO: NORMAL /LPF
ERYTHROCYTE [DISTWIDTH] IN BLOOD BY AUTOMATED COUNT: 12.2 % (ref 11.5–14.5)
GLOBULIN SER CALC-MCNC: 3.8 G/DL (ref 2–4)
GLUCOSE SERPL-MCNC: 90 MG/DL (ref 65–100)
GLUCOSE UR STRIP.AUTO-MCNC: NEGATIVE MG/DL
HCT VFR BLD AUTO: 53.4 % (ref 36.6–50.3)
HGB BLD-MCNC: 18.7 G/DL (ref 12.1–17)
HGB UR QL STRIP: NEGATIVE
IMM GRANULOCYTES # BLD AUTO: 0 K/UL (ref 0–0.04)
IMM GRANULOCYTES NFR BLD AUTO: 0 % (ref 0–0.5)
KETONES UR QL STRIP.AUTO: 15 MG/DL
LEUKOCYTE ESTERASE UR QL STRIP.AUTO: ABNORMAL
LIPASE SERPL-CCNC: 49 U/L (ref 73–393)
LYMPHOCYTES # BLD: 0.5 K/UL (ref 0.8–3.5)
LYMPHOCYTES NFR BLD: 7 % (ref 12–49)
MCH RBC QN AUTO: 32.7 PG (ref 26–34)
MCHC RBC AUTO-ENTMCNC: 35 G/DL (ref 30–36.5)
MCV RBC AUTO: 93.4 FL (ref 80–99)
MONOCYTES # BLD: 0.2 K/UL (ref 0–1)
MONOCYTES NFR BLD: 3 % (ref 5–13)
N GONORRHOEA DNA SPEC QL NAA+PROBE: POSITIVE
NEUTS SEG # BLD: 7 K/UL (ref 1.8–8)
NEUTS SEG NFR BLD: 90 % (ref 32–75)
NITRITE UR QL STRIP.AUTO: NEGATIVE
NRBC # BLD: 0 K/UL (ref 0–0.01)
NRBC BLD-RTO: 0 PER 100 WBC
PH UR STRIP: 8 (ref 5–8)
PLATELET # BLD AUTO: 189 K/UL (ref 150–400)
PMV BLD AUTO: 9.8 FL (ref 8.9–12.9)
POTASSIUM SERPL-SCNC: 3.4 MMOL/L (ref 3.5–5.1)
PROT SERPL-MCNC: 7.9 G/DL (ref 6.4–8.2)
PROT UR STRIP-MCNC: 30 MG/DL
RBC # BLD AUTO: 5.72 M/UL (ref 4.1–5.7)
RBC #/AREA URNS HPF: NORMAL /HPF (ref 0–5)
RBC MORPH BLD: ABNORMAL
SAMPLE TYPE: ABNORMAL
SERVICE CMNT-IMP: ABNORMAL
SODIUM SERPL-SCNC: 144 MMOL/L (ref 136–145)
SP GR UR REFRACTOMETRY: 1.01 (ref 1–1.03)
SPECIMEN SOURCE: ABNORMAL
UROBILINOGEN UR QL STRIP.AUTO: 2 EU/DL (ref 0.2–1)
WBC # BLD AUTO: 7.7 K/UL (ref 4.1–11.1)
WBC URNS QL MICRO: NORMAL /HPF (ref 0–4)

## 2023-02-08 PROCEDURE — 96372 THER/PROPH/DIAG INJ SC/IM: CPT

## 2023-02-08 PROCEDURE — 96375 TX/PRO/DX INJ NEW DRUG ADDON: CPT

## 2023-02-08 PROCEDURE — 99284 EMERGENCY DEPT VISIT MOD MDM: CPT

## 2023-02-08 PROCEDURE — 81001 URINALYSIS AUTO W/SCOPE: CPT

## 2023-02-08 PROCEDURE — 83690 ASSAY OF LIPASE: CPT

## 2023-02-08 PROCEDURE — 87491 CHLMYD TRACH DNA AMP PROBE: CPT

## 2023-02-08 PROCEDURE — 74011250636 HC RX REV CODE- 250/636: Performed by: PHYSICIAN ASSISTANT

## 2023-02-08 PROCEDURE — 36415 COLL VENOUS BLD VENIPUNCTURE: CPT

## 2023-02-08 PROCEDURE — 96361 HYDRATE IV INFUSION ADD-ON: CPT

## 2023-02-08 PROCEDURE — 74011000250 HC RX REV CODE- 250: Performed by: PHYSICIAN ASSISTANT

## 2023-02-08 PROCEDURE — 96374 THER/PROPH/DIAG INJ IV PUSH: CPT

## 2023-02-08 PROCEDURE — 85025 COMPLETE CBC W/AUTO DIFF WBC: CPT

## 2023-02-08 PROCEDURE — 80053 COMPREHEN METABOLIC PANEL: CPT

## 2023-02-08 PROCEDURE — 74011250637 HC RX REV CODE- 250/637: Performed by: PHYSICIAN ASSISTANT

## 2023-02-08 RX ORDER — PROCHLORPERAZINE EDISYLATE 5 MG/ML
10 INJECTION INTRAMUSCULAR; INTRAVENOUS
Status: COMPLETED | OUTPATIENT
Start: 2023-02-08 | End: 2023-02-08

## 2023-02-08 RX ORDER — AZITHROMYCIN 500 MG/1
1000 TABLET, FILM COATED ORAL
Status: COMPLETED | OUTPATIENT
Start: 2023-02-08 | End: 2023-02-08

## 2023-02-08 RX ORDER — ONDANSETRON 4 MG/1
4 TABLET, FILM COATED ORAL
Qty: 20 TABLET | Refills: 0 | Status: SHIPPED | OUTPATIENT
Start: 2023-02-08

## 2023-02-08 RX ORDER — ONDANSETRON 4 MG/1
4 TABLET, ORALLY DISINTEGRATING ORAL
Status: COMPLETED | OUTPATIENT
Start: 2023-02-08 | End: 2023-02-08

## 2023-02-08 RX ORDER — DIPHENHYDRAMINE HYDROCHLORIDE 50 MG/ML
25 INJECTION, SOLUTION INTRAMUSCULAR; INTRAVENOUS
Status: COMPLETED | OUTPATIENT
Start: 2023-02-08 | End: 2023-02-08

## 2023-02-08 RX ADMIN — SODIUM CHLORIDE 1000 ML: 9 INJECTION, SOLUTION INTRAVENOUS at 12:22

## 2023-02-08 RX ADMIN — AZITHROMYCIN MONOHYDRATE 1000 MG: 500 TABLET ORAL at 13:11

## 2023-02-08 RX ADMIN — PROCHLORPERAZINE EDISYLATE 10 MG: 5 INJECTION INTRAMUSCULAR; INTRAVENOUS at 12:22

## 2023-02-08 RX ADMIN — LIDOCAINE HYDROCHLORIDE 500 MG: 10 INJECTION, SOLUTION EPIDURAL; INFILTRATION; INTRACAUDAL; PERINEURAL at 13:11

## 2023-02-08 RX ADMIN — DIPHENHYDRAMINE HYDROCHLORIDE 25 MG: 50 INJECTION, SOLUTION INTRAMUSCULAR; INTRAVENOUS at 12:22

## 2023-02-08 RX ADMIN — ONDANSETRON 4 MG: 4 TABLET, ORALLY DISINTEGRATING ORAL at 11:51

## 2023-02-08 NOTE — LETTER
2/9/2023      [address] [place] [address]      Dear Mr. Alvaro Salguero were seen in the Emergency Department of [address] [place] [address] on 2/8/23 and had lab and/or radiology tests performed. The gonorrhea, chlamydia from your Emergency Department visit on 2/8/23 was positive. You were treated appropriately during your visit. No further treatment is required. If you have not improved or are worsening, please follow up with your primary care doctor or Emergency department as soon as possible. Your partner needs to be treated. If you have any questions please contact the Emergency Department at [phone].       Sincerely,    MAIDA Scott [address] EMERGENCY DEPT  [address] [facility] [address] [phone]

## 2023-02-08 NOTE — Clinical Note
[facility] EMERGENCY DEPT  [address] [facility] [address] [phone]    Work/School Note    Date: 2/8/2023    To Whom It May concern:    Mariposa Gilbert III was seen and treated today in the emergency room by the following provider(s):  Attending Provider: Laurence Velásquez MD  Physician Assistant: Balaji Hall, [address]. Faith Cruz is excused from work/school on 2/8/2023 through 2/10/2023. He is medically clear to return to work/school on 2/11/2023.          Sincerely,          Dede Coto, [address]

## 2023-02-08 NOTE — ED PROVIDER NOTES
[facility] EMERGENCY DEPT  EMERGENCY DEPARTMENT ENCOUNTER       Pt Name: Ramona Snowden  MRN: [id number]  [place] 2000  Date of evaluation: 2/8/2023  Provider: CYNDI Tejada   PCP: None  Note Started: 11:47 AM 2/8/23     CHIEF COMPLAINT       Chief Complaint   Patient presents with    Vomiting        HISTORY OF PRESENT ILLNESS: 1 or more elements      History From: Patient  HPI Limitations : None     Estefani Guajardo III is a 23 y.o. male who presents BIB self with N/V/D that began abruptly this morning. He reports vomiting yellow fluid at least 10 times since onset of symptoms. He has vomited twice while waiting here in the ED. He endorses feeling dehydrated and dizzy. He endorses epigastric pain which she thinks is just due to vomiting. He attributes his symptoms to eating bad frozen shrimp from a [email or web address] platter last night. No one else ate the shrimp. Denies known sick contacts. He admits \"a little\" diarrhea today. Denies fever, congestion, cough, blood in emesis or stool, urinary symptoms, rash. Nursing Notes were all reviewed and agreed with or any disagreements were addressed in the HPI. REVIEW OF SYSTEMS      Review of Systems   Constitutional:  Negative for fever. HENT:  Negative for congestion. Eyes:  Negative for redness. Respiratory:  Negative for shortness of breath. Cardiovascular:  Negative for chest pain. Gastrointestinal:  Positive for abdominal pain (epigastric), nausea and vomiting. Genitourinary:  Negative for difficulty urinating and dysuria. Skin:  Negative for rash. Allergic/Immunologic: Negative for immunocompromised state. Neurological:  Positive for dizziness. Negative for syncope. Hematological:  Does not bruise/bleed easily. Psychiatric/Behavioral:  Negative for agitation. Positives and Pertinent negatives as per HPI.     PAST HISTORY     Past Medical History:  Past Medical History:   Diagnosis Date    Migraines        Past Surgical History:  History reviewed. No pertinent surgical history. Family History:  History reviewed. No pertinent family history. Social History:  Social History     Tobacco Use    Smoking status: Every Day     Packs/day: 0.25     Types: Cigarettes    Smokeless tobacco: Current   Substance Use Topics    Alcohol use: Yes     Comment: occ    Drug use: Yes     Types: Marijuana       Allergies:  No Known Allergies    CURRENT MEDICATIONS      Previous Medications    No medications on file       PHYSICAL EXAM      ED Triage Vitals [02/08/23 1142]   ED Encounter Vitals Group      /87      Pulse (Heart Rate) 100      Resp Rate 16      Temp 98.2 °F (36.8 °C)      Temp src       O2 Sat (%) 100 %      Weight 125 lb      Height         Physical Exam  Vitals and nursing note reviewed. Constitutional:       General: He is in acute distress. Appearance: Normal appearance. He is not toxic-appearing. Comments: Holding emesis bag containing clear yellow vomitus at time of initial evaluation. HENT:      Head: Normocephalic and atraumatic. Nose: Nose normal.      Mouth/Throat:      Mouth: Mucous membranes are dry. Pharynx: No posterior oropharyngeal erythema. Eyes:      Extraocular Movements: Extraocular movements intact. Conjunctiva/sclera: Conjunctivae normal.   Cardiovascular:      Rate and Rhythm: Regular rhythm. Tachycardia present. Pulmonary:      Effort: Pulmonary effort is normal.      Breath sounds: Normal breath sounds. Abdominal:      Palpations: Abdomen is soft. Tenderness: There is abdominal tenderness in the epigastric area. There is no right CVA tenderness or left CVA tenderness. Negative signs include Jose's sign and McBurney's sign. Musculoskeletal:         General: Normal range of motion. Cervical back: Normal range of motion and neck supple. Skin:     General: Skin is warm and dry. Neurological:      General: No focal deficit present.       Mental Status: He is alert and oriented to person, place, and time. Gait: Gait normal.   Psychiatric:         Mood and Affect: Mood normal.         Behavior: Behavior normal.        DIAGNOSTIC RESULTS   LABS:     Recent Results (from the past 12 hour(s))   CBC WITH AUTOMATED DIFF    Collection Time: 02/08/23 12:14 PM   Result Value Ref Range    WBC 7.7 4.1 - 11.1 K/uL    RBC 5.72 (H) 4.10 - 5.70 M/uL    HGB 18.7 (H) 12.1 - 17.0 g/dL    HCT 53.4 (H) 36.6 - 50.3 %    MCV 93.4 80.0 - 99.0 FL    MCH 32.7 26.0 - 34.0 PG    MCHC 35.0 30.0 - 36.5 g/dL    RDW 12.2 11.5 - 14.5 %    PLATELET 266 494 - 527 K/uL    MPV 9.8 8.9 - 12.9 FL    NRBC 0.0 0  WBC    ABSOLUTE NRBC 0.00 0.00 - 0.01 K/uL    NEUTROPHILS 90 (H) 32 - 75 %    LYMPHOCYTES 7 (L) 12 - 49 %    MONOCYTES 3 (L) 5 - 13 %    EOSINOPHILS 0 0 - 7 %    BASOPHILS 0 0 - 1 %    IMMATURE GRANULOCYTES 0 0.0 - 0.5 %    ABS. NEUTROPHILS 7.0 1.8 - 8.0 K/UL    ABS. LYMPHOCYTES 0.5 (L) 0.8 - 3.5 K/UL    ABS. MONOCYTES 0.2 0.0 - 1.0 K/UL    ABS. EOSINOPHILS 0.0 0.0 - 0.4 K/UL    ABS. BASOPHILS 0.0 0.0 - 0.1 K/UL    ABS. IMM.  GRANS. 0.0 0.00 - 0.04 K/UL    DF SMEAR SCANNED      RBC COMMENTS ANISOCYTOSIS  1+       LIPASE    Collection Time: 02/08/23 12:14 PM   Result Value Ref Range    Lipase 49 (L) 73 - 393 U/L   URINALYSIS W/ RFLX MICROSCOPIC    Collection Time: 02/08/23 12:14 PM   Result Value Ref Range    Color DARK YELLOW      Appearance CLEAR CLEAR      Specific gravity 1.010 1.003 - 1.030      pH (UA) 8.0 5.0 - 8.0      Protein 30 (A) NEG mg/dL    Glucose Negative NEG mg/dL    Ketone 15 (A) NEG mg/dL    Bilirubin Negative NEG      Blood Negative NEG      Urobilinogen 2.0 (H) 0.2 - 1.0 EU/dL    Nitrites Negative NEG      Leukocyte Esterase MODERATE (A) NEG     METABOLIC PANEL, COMPREHENSIVE    Collection Time: 02/08/23 12:14 PM   Result Value Ref Range    Sodium 144 136 - 145 mmol/L    Potassium 3.4 (L) 3.5 - 5.1 mmol/L    Chloride 103 97 - 108 mmol/L    CO2 31 21 - 32 mmol/L    Anion gap 10 5 - 15 mmol/L    Glucose 90 65 - 100 mg/dL    BUN 10 6 - 20 MG/DL    Creatinine 0.84 0.70 - 1.30 MG/DL    BUN/Creatinine ratio 12 12 - 20      eGFR >60 >60 ml/min/1.73m2    Calcium 8.8 8.5 - 10.1 MG/DL    Bilirubin, total 0.6 0.2 - 1.0 MG/DL    ALT (SGPT) 22 12 - 78 U/L    AST (SGOT) 25 15 - 37 U/L    Alk. phosphatase 98 45 - 117 U/L    Protein, total 7.9 6.4 - 8.2 g/dL    Albumin 4.1 3.5 - 5.0 g/dL    Globulin 3.8 2.0 - 4.0 g/dL    A-G Ratio 1.1 1.1 - 2.2     URINE MICROSCOPIC ONLY    Collection Time: 02/08/23 12:14 PM   Result Value Ref Range    WBC 20-50 0 - 4 /hpf    RBC 0-5 0 - 5 /hpf    Epithelial cells FEW FEW /lpf    Bacteria Negative NEG /hpf        EKG: When ordered, EKG's are interpreted by the Emergency Department Physician in the absence of a cardiologist.  Please see their note for interpretation of EKG. RADIOLOGY:  Non-plain film images such as CT, Ultrasound and MRI are read by the radiologist. Plain radiographic images are visualized and preliminarily interpreted by the ED Provider with the below findings:          Interpretation per the Radiologist below, if available at the time of this note:     No results found.       PROCEDURES   Unless otherwise noted below, none  Procedures     CRITICAL CARE TIME       EMERGENCY DEPARTMENT COURSE and DIFFERENTIAL DIAGNOSIS/MDM   Vitals:    Vitals:    02/08/23 1142 02/08/23 1226 02/08/23 1227   BP: 126/87 (!) 134/90    Pulse: 100  91   Resp: 16     Temp: 98.2 °F (36.8 °C)  98.7 °F (37.1 °C)   SpO2: 100%  100%   Weight: 56.7 kg (125 lb)          Patient was given the following medications:  Medications   ondansetron (ZOFRAN ODT) tablet 4 mg (4 mg Oral Given 2/8/23 1151)   sodium chloride 0.9 % bolus infusion 1,000 mL (0 mL IntraVENous IV Completed 2/8/23 1314)   prochlorperazine (COMPAZINE) injection 10 mg (10 mg IntraVENous Given 2/8/23 1222)   diphenhydrAMINE (BENADRYL) injection 25 mg (25 mg IntraVENous Given 2/8/23 1222)   cefTRIAXone (ROCEPHIN) 500 mg in lidocaine (PF) (XYLOCAINE) 10 mg/mL (1 %) IM injection (500 mg IntraMUSCular Given 2/8/23 1311)   azithromycin (ZITHROMAX) tablet 1,000 mg (1,000 mg Oral Given 2/8/23 1311)       CONSULTS: (Who and What was discussed)  None    Chronic Conditions:     Social Determinants affecting Dx or Tx: None    Records Reviewed (source and summary of external records): None    CC/HPI Summary, DDx, ED Course, and Reassessment:     ED Course as of 02/08/23 1335   Wed Feb 08, 2023   150 20-year-old male presents with abrupt onset nausea, vomiting, and diarrhea that began acutely this morning. He attributes his symptoms to eating bad shrimp last night. He endorses some upper abdominal pain and has epigastric TTP on exam.  No right upper quadrant tenderness, negative Jose sign. He has vomited twice since receiving Zofran ODT, so we will proceed with IV fluids and IV antiemetics. Given severity of symptoms, presence of dry mucous membranes and tachycardia, will also check electrolytes, CBC, lipase, UA, and reassess. [AS]   3736 Labs have resulted. He is hemoconcentrated, further evidencing his dehydration, however he has no leukocytosis, electrolyte abnormality, or ERIC. His lipase is WNL, ruling out pancreatitis. Low suspicion for hepatitis, gallstones, acute cholecystitis. His urine is notable for moderate leuk esterase; negative for nitrates, blood, and bacteria. I spoke with patient regarding this finding. We discussed the possibility of STI. He denies urethral discharge or urinary symptoms, but is agreeable to presumptive treatment based on findings today. He has been p.o. challenged successfully after Compazine and repeat abdominal exam is benign, so will treat with IM Rocephin and oral azithromycin while here in the ED. Feeling much better after hydration with IV fluids. We discussed possibility of food poisoning versus viral gastroenteritis.   He is agreeable to discharge with plans for Zofran ODT hydration at home. [AS]      ED Course User Index  [AS] CYNDI Kennedy       Disposition Considerations (Tests not done, Shared Decision Making, Pt Expectation of Test or Tx.):   Considered advanced imaging with ultrasound or CT scan, but ultimately felt that it was unnecessary given his improvement after antiemetics and IV fluids. FINAL IMPRESSION     1. Dehydration    2. Nausea and vomiting, unspecified vomiting type    3. Urethritis          DISPOSITION/PLAN   Discharged    Discharge Note: The patient is stable for discharge home. The signs, symptoms, diagnosis, and discharge instructions have been discussed, understanding conveyed, and agreed upon. The patient is to follow up as recommended or return to ER should their symptoms worsen. PATIENT REFERRED TO:  Follow-up Information       Follow up With Specialties Details Why [address] [facility] EMERGENCY DEPT Emergency Medicine  As needed, If symptoms worsen [address]    Your PCP  Call  For follow up               DISCHARGE MEDICATIONS:  Current Discharge Medication List        START taking these medications    Details   ondansetron hcl (Zofran) 4 mg tablet Take 1 Tablet by mouth every eight (8) hours as needed for Nausea or Vomiting. Qty: 20 Tablet, Refills: 0  Start date: 2/8/2023               DISCONTINUED MEDICATIONS:  Current Discharge Medication List          ED Attending Involvement : I have seen and evaluated the patient. My supervision physician was available for consultation. I am the Primary Clinician of Record. Jorge L Cordero (electronically signed)    (Please note that parts of this dictation were completed with voice recognition software. Quite often unanticipated grammatical, syntax, homophones, and other interpretive errors are inadvertently transcribed by the computer software. Please disregards these errors.  Please excuse any errors that have escaped final proofreading.)

## 2023-02-08 NOTE — ED NOTES
Emergency Department Nursing Plan of Care       The Nursing Plan of Care is developed from the Nursing assessment and Emergency Department Attending provider initial evaluation. The plan of care may be reviewed in the ED Provider note.     The Plan of Care was developed with the following considerations:   Patient / Family readiness to learn indicated by:verbalized understanding  Persons(s) to be included in education: patient  Barriers to Learning/Limitations:No    Signed     Levorn Members, RN    2/8/2023   11:54 AM

## 2023-02-08 NOTE — ED TRIAGE NOTES
Pt arrives to the ED AAOX4 with a c/c of vomiting and some diarrhea onset this morning. Pt states symptoms started after he ate some Shrimp. Pt is noted in stable condition and in no acute distress.

## 2025-07-06 ENCOUNTER — HOSPITAL ENCOUNTER (EMERGENCY)
Facility: HOSPITAL | Age: 25
Discharge: HOME | End: 2025-07-06

## 2025-07-06 VITALS
TEMPERATURE: 98 F | BODY MASS INDEX: 18.56 KG/M2 | OXYGEN SATURATION: 99 % | RESPIRATION RATE: 18 BRPM | HEART RATE: 88 BPM | WEIGHT: 115 LBS | SYSTOLIC BLOOD PRESSURE: 116 MMHG | DIASTOLIC BLOOD PRESSURE: 97 MMHG

## 2025-07-06 DIAGNOSIS — R11.2 NAUSEA AND VOMITING, UNSPECIFIED VOMITING TYPE: Primary | ICD-10-CM

## 2025-07-06 LAB
ALBUMIN SERPL-MCNC: 4.4 G/DL (ref 3.5–5)
ALBUMIN/GLOB SERPL: 1.4 (ref 1.1–2.2)
ALP SERPL-CCNC: 63 U/L (ref 45–117)
ALT SERPL-CCNC: 24 U/L (ref 12–78)
ANION GAP SERPL CALC-SCNC: 16 MMOL/L (ref 2–12)
APPEARANCE UR: CLEAR
AST SERPL-CCNC: 36 U/L (ref 15–37)
BACTERIA URNS QL MICRO: NEGATIVE /HPF
BASOPHILS # BLD: 0.04 K/UL (ref 0–0.1)
BASOPHILS NFR BLD: 0.3 % (ref 0–1)
BILIRUB SERPL-MCNC: 0.9 MG/DL (ref 0.2–1)
BILIRUB UR QL CFM: NEGATIVE
BUN SERPL-MCNC: 17 MG/DL (ref 6–20)
BUN/CREAT SERPL: 19 (ref 12–20)
CALCIUM SERPL-MCNC: 9.1 MG/DL (ref 8.5–10.1)
CHLORIDE SERPL-SCNC: 103 MMOL/L (ref 97–108)
CO2 SERPL-SCNC: 24 MMOL/L (ref 21–32)
COLOR UR: ABNORMAL
CREAT SERPL-MCNC: 0.89 MG/DL (ref 0.7–1.3)
DIFFERENTIAL METHOD BLD: ABNORMAL
EOSINOPHIL # BLD: 0.01 K/UL (ref 0–0.4)
EOSINOPHIL NFR BLD: 0.1 % (ref 0–7)
EPITH CASTS URNS QL MICRO: ABNORMAL /LPF
ERYTHROCYTE [DISTWIDTH] IN BLOOD BY AUTOMATED COUNT: 12.7 % (ref 11.5–14.5)
GLOBULIN SER CALC-MCNC: 3.2 G/DL (ref 2–4)
GLUCOSE SERPL-MCNC: 67 MG/DL (ref 65–100)
GLUCOSE UR STRIP.AUTO-MCNC: NEGATIVE MG/DL
HCT VFR BLD AUTO: 45.5 % (ref 36.6–50.3)
HGB BLD-MCNC: 16.3 G/DL (ref 12.1–17)
HGB UR QL STRIP: NEGATIVE
IMM GRANULOCYTES # BLD AUTO: 0.05 K/UL (ref 0–0.04)
IMM GRANULOCYTES NFR BLD AUTO: 0.4 % (ref 0–0.5)
KETONES UR QL STRIP.AUTO: 40 MG/DL
LEUKOCYTE ESTERASE UR QL STRIP.AUTO: NEGATIVE
LYMPHOCYTES # BLD: 1.54 K/UL (ref 0.8–3.5)
LYMPHOCYTES NFR BLD: 11.9 % (ref 12–49)
MCH RBC QN AUTO: 32.7 PG (ref 26–34)
MCHC RBC AUTO-ENTMCNC: 35.8 G/DL (ref 30–36.5)
MCV RBC AUTO: 91.2 FL (ref 80–99)
MONOCYTES # BLD: 0.45 K/UL (ref 0–1)
MONOCYTES NFR BLD: 3.5 % (ref 5–13)
NEUTS SEG # BLD: 10.85 K/UL (ref 1.8–8)
NEUTS SEG NFR BLD: 83.8 % (ref 32–75)
NITRITE UR QL STRIP.AUTO: NEGATIVE
NRBC # BLD: 0 K/UL (ref 0–0.01)
NRBC BLD-RTO: 0 PER 100 WBC
PH UR STRIP: 5.5 (ref 5–8)
PLATELET # BLD AUTO: 227 K/UL (ref 150–400)
PMV BLD AUTO: 10.2 FL (ref 8.9–12.9)
POTASSIUM SERPL-SCNC: 4.1 MMOL/L (ref 3.5–5.1)
PROT SERPL-MCNC: 7.6 G/DL (ref 6.4–8.2)
PROT UR STRIP-MCNC: 30 MG/DL
RBC # BLD AUTO: 4.99 M/UL (ref 4.1–5.7)
RBC #/AREA URNS HPF: ABNORMAL /HPF (ref 0–5)
SODIUM SERPL-SCNC: 143 MMOL/L (ref 136–145)
SP GR UR REFRACTOMETRY: 1.02
URINE CULTURE IF INDICATED: ABNORMAL
UROBILINOGEN UR QL STRIP.AUTO: 1 EU/DL (ref 0.2–1)
WBC # BLD AUTO: 12.9 K/UL (ref 4.1–11.1)
WBC URNS QL MICRO: ABNORMAL /HPF (ref 0–4)

## 2025-07-06 PROCEDURE — 36415 COLL VENOUS BLD VENIPUNCTURE: CPT

## 2025-07-06 PROCEDURE — 81001 URINALYSIS AUTO W/SCOPE: CPT

## 2025-07-06 PROCEDURE — 99284 EMERGENCY DEPT VISIT MOD MDM: CPT

## 2025-07-06 PROCEDURE — 2580000003 HC RX 258: Performed by: NURSE PRACTITIONER

## 2025-07-06 PROCEDURE — 80053 COMPREHEN METABOLIC PANEL: CPT

## 2025-07-06 PROCEDURE — 85025 COMPLETE CBC W/AUTO DIFF WBC: CPT

## 2025-07-06 PROCEDURE — 96374 THER/PROPH/DIAG INJ IV PUSH: CPT

## 2025-07-06 PROCEDURE — 6360000002 HC RX W HCPCS: Performed by: NURSE PRACTITIONER

## 2025-07-06 RX ORDER — ONDANSETRON 4 MG/1
4 TABLET, ORALLY DISINTEGRATING ORAL EVERY 8 HOURS PRN
Qty: 20 TABLET | Refills: 0 | Status: SHIPPED | OUTPATIENT
Start: 2025-07-06

## 2025-07-06 RX ORDER — ONDANSETRON 2 MG/ML
4 INJECTION INTRAMUSCULAR; INTRAVENOUS
Status: COMPLETED | OUTPATIENT
Start: 2025-07-06 | End: 2025-07-06

## 2025-07-06 RX ORDER — 0.9 % SODIUM CHLORIDE 0.9 %
1000 INTRAVENOUS SOLUTION INTRAVENOUS ONCE
Status: COMPLETED | OUTPATIENT
Start: 2025-07-06 | End: 2025-07-06

## 2025-07-06 RX ADMIN — ONDANSETRON 4 MG: 2 INJECTION, SOLUTION INTRAMUSCULAR; INTRAVENOUS at 11:01

## 2025-07-06 RX ADMIN — SODIUM CHLORIDE 1000 ML: 0.9 INJECTION, SOLUTION INTRAVENOUS at 11:02

## 2025-07-06 ASSESSMENT — PAIN DESCRIPTION - PAIN TYPE: TYPE: ACUTE PAIN

## 2025-07-06 ASSESSMENT — PAIN SCALES - GENERAL: PAINLEVEL_OUTOF10: 5

## 2025-07-06 ASSESSMENT — PAIN DESCRIPTION - ORIENTATION: ORIENTATION: ANTERIOR

## 2025-07-06 ASSESSMENT — LIFESTYLE VARIABLES
HOW OFTEN DO YOU HAVE A DRINK CONTAINING ALCOHOL: 2-3 TIMES A WEEK
HOW MANY STANDARD DRINKS CONTAINING ALCOHOL DO YOU HAVE ON A TYPICAL DAY: 3 OR 4

## 2025-07-06 ASSESSMENT — PAIN - FUNCTIONAL ASSESSMENT: PAIN_FUNCTIONAL_ASSESSMENT: 0-10

## 2025-07-06 ASSESSMENT — PAIN DESCRIPTION - DESCRIPTORS: DESCRIPTORS: ACHING

## 2025-07-06 ASSESSMENT — PAIN DESCRIPTION - LOCATION: LOCATION: ABDOMEN

## 2025-07-06 NOTE — ED NOTES
Pt presents ambulatory to the ED c/o nausea and vomiting since this AM. Pt endorses drinking and mariajuana use last night. No medical hx of abdominal surgeries.     Emergency Department Nursing Plan of Care       The Nursing Plan of Care is developed from the Nursing assessment and Emergency Department Attending provider initial evaluation.  The plan of care may be reviewed in the “ED Provider note”.      The Plan of Care was developed with the following considerations:  Patient / Family readiness to learn indicated by:verbalized understanding  Persons(s) to be included in education: patient  Barriers to Learning/Limitations:None      Signed     KELSY ARNOLD RN    7/6/2025   10:57 AM

## 2025-07-06 NOTE — ED TRIAGE NOTES
Pt c/o abd pain with n/v that started this morning. Per EMS, pt was found to be hypoglycemia on arrival with a glucose in the 50's. Pt given oral glucose. Repeat glucose 70.

## 2025-07-07 ASSESSMENT — ENCOUNTER SYMPTOMS
SHORTNESS OF BREATH: 0
ABDOMINAL PAIN: 0
VOMITING: 1
NAUSEA: 1
BACK PAIN: 0

## 2025-07-08 NOTE — ED PROVIDER NOTES
ondansetron 4 MG tablet  Commonly known as: ZOFRAN               Where to Get Your Medications        These medications were sent to [facility]/pharmacy #[id number] - [place], [place] - [address] - P [phone] - F [phone] [address] [facility] [address]      Phone: [phone]   ondansetron 4 MG disintegrating tablet           DISCONTINUED MEDICATIONS:  Discharge Medication List as of 7/6/2025 12:35 PM          I have seen and evaluated the patient autonomously. My supervision physician was on site and available for consultation if needed.     I am the Primary Clinician of Record.   AMAYA Hunter NP (electronically signed)    (Please note that parts of this dictation were completed with voice recognition software. Quite often unanticipated grammatical, syntax, homophones, and other interpretive errors are inadvertently transcribed by the computer software. Please disregards these errors. Please excuse any errors that have escaped final proofreading.)        Yessenia Aguillon APRN - NP  07/07/25 3735